# Patient Record
Sex: MALE | Race: BLACK OR AFRICAN AMERICAN | Employment: OTHER | URBAN - METROPOLITAN AREA
[De-identification: names, ages, dates, MRNs, and addresses within clinical notes are randomized per-mention and may not be internally consistent; named-entity substitution may affect disease eponyms.]

---

## 2017-01-03 RX ORDER — CLOPIDOGREL BISULFATE 75 MG/1
TABLET ORAL
Qty: 90 TABLET | Refills: 1 | Status: SHIPPED | OUTPATIENT
Start: 2017-01-03 | End: 2017-07-07 | Stop reason: SDUPTHER

## 2017-01-09 RX ORDER — CLOPIDOGREL BISULFATE 75 MG/1
TABLET ORAL
Qty: 90 TABLET | Refills: 3 | Status: SHIPPED | OUTPATIENT
Start: 2017-01-09 | End: 2017-07-07 | Stop reason: SDUPTHER

## 2017-02-17 RX ORDER — LOVASTATIN 20 MG/1
TABLET ORAL
Qty: 90 TABLET | Refills: 3 | Status: SHIPPED | OUTPATIENT
Start: 2017-02-17 | End: 2018-02-16 | Stop reason: SDUPTHER

## 2017-02-17 RX ORDER — POTASSIUM CHLORIDE 600 MG/1
TABLET, FILM COATED, EXTENDED RELEASE ORAL
Qty: 90 TABLET | Refills: 3 | Status: SHIPPED | OUTPATIENT
Start: 2017-02-17 | End: 2018-02-02 | Stop reason: SDUPTHER

## 2017-02-20 DIAGNOSIS — I50.9 CHF WITH CARDIOMYOPATHY (HCC): ICD-10-CM

## 2017-02-20 DIAGNOSIS — I10 ESSENTIAL HYPERTENSION: Primary | ICD-10-CM

## 2017-02-20 DIAGNOSIS — E78.00 PURE HYPERCHOLESTEROLEMIA: ICD-10-CM

## 2017-02-20 DIAGNOSIS — I25.10 CORONARY ARTERY DISEASE INVOLVING NATIVE CORONARY ARTERY OF NATIVE HEART WITHOUT ANGINA PECTORIS: ICD-10-CM

## 2017-02-20 DIAGNOSIS — I42.9 CHF WITH CARDIOMYOPATHY (HCC): ICD-10-CM

## 2017-02-20 RX ORDER — AMLODIPINE BESYLATE 5 MG/1
5 TABLET ORAL DAILY
Qty: 90 TABLET | Refills: 3 | Status: SHIPPED | OUTPATIENT
Start: 2017-02-20 | End: 2018-02-16 | Stop reason: SDUPTHER

## 2017-02-28 ENCOUNTER — OFFICE VISIT (OUTPATIENT)
Dept: CARDIOLOGY | Age: 77
End: 2017-02-28

## 2017-02-28 ENCOUNTER — HOSPITAL ENCOUNTER (OUTPATIENT)
Dept: GENERAL RADIOLOGY | Age: 77
Discharge: HOME | End: 2017-02-28
Payer: MEDICARE

## 2017-02-28 VITALS
OXYGEN SATURATION: 97 % | DIASTOLIC BLOOD PRESSURE: 67 MMHG | SYSTOLIC BLOOD PRESSURE: 112 MMHG | TEMPERATURE: 97.8 F | HEART RATE: 62 BPM | HEIGHT: 64 IN | BODY MASS INDEX: 25.27 KG/M2 | RESPIRATION RATE: 18 BRPM | WEIGHT: 148 LBS

## 2017-02-28 DIAGNOSIS — E78.00 PURE HYPERCHOLESTEROLEMIA: ICD-10-CM

## 2017-02-28 DIAGNOSIS — Z87.891 HISTORY OF SMOKING: ICD-10-CM

## 2017-02-28 DIAGNOSIS — I42.9 CHF WITH CARDIOMYOPATHY (HCC): ICD-10-CM

## 2017-02-28 DIAGNOSIS — I25.10 CORONARY ARTERY DISEASE INVOLVING NATIVE CORONARY ARTERY OF NATIVE HEART WITHOUT ANGINA PECTORIS: ICD-10-CM

## 2017-02-28 DIAGNOSIS — I50.9 CHF WITH CARDIOMYOPATHY (HCC): ICD-10-CM

## 2017-02-28 DIAGNOSIS — J18.9 PNEUMONITIS: ICD-10-CM

## 2017-02-28 DIAGNOSIS — R06.02 SHORTNESS OF BREATH: ICD-10-CM

## 2017-02-28 DIAGNOSIS — J18.9 PNEUMONITIS: Primary | ICD-10-CM

## 2017-02-28 DIAGNOSIS — I10 ESSENTIAL HYPERTENSION: ICD-10-CM

## 2017-02-28 PROCEDURE — G8427 DOCREV CUR MEDS BY ELIG CLIN: HCPCS | Performed by: INTERNAL MEDICINE

## 2017-02-28 PROCEDURE — 4040F PNEUMOC VAC/ADMIN/RCVD: CPT | Performed by: INTERNAL MEDICINE

## 2017-02-28 PROCEDURE — 1123F ACP DISCUSS/DSCN MKR DOCD: CPT | Performed by: INTERNAL MEDICINE

## 2017-02-28 PROCEDURE — G8420 CALC BMI NORM PARAMETERS: HCPCS | Performed by: INTERNAL MEDICINE

## 2017-02-28 PROCEDURE — 93000 ELECTROCARDIOGRAM COMPLETE: CPT | Performed by: INTERNAL MEDICINE

## 2017-02-28 PROCEDURE — 71020 XR CHEST STANDARD TWO VW: CPT

## 2017-02-28 PROCEDURE — 1036F TOBACCO NON-USER: CPT | Performed by: INTERNAL MEDICINE

## 2017-02-28 PROCEDURE — 99214 OFFICE O/P EST MOD 30 MIN: CPT | Performed by: INTERNAL MEDICINE

## 2017-02-28 PROCEDURE — G8598 ASA/ANTIPLAT THER USED: HCPCS | Performed by: INTERNAL MEDICINE

## 2017-02-28 PROCEDURE — G8484 FLU IMMUNIZE NO ADMIN: HCPCS | Performed by: INTERNAL MEDICINE

## 2017-03-14 ASSESSMENT — ENCOUNTER SYMPTOMS
CHEST TIGHTNESS: 0
GASTROINTESTINAL NEGATIVE: 1
EYES NEGATIVE: 1
SHORTNESS OF BREATH: 0
ALLERGIC/IMMUNOLOGIC NEGATIVE: 1

## 2017-04-03 RX ORDER — SPIRONOLACTONE AND HYDROCHLOROTHIAZIDE 25; 25 MG/1; MG/1
TABLET ORAL
Qty: 45 TABLET | Refills: 2 | Status: SHIPPED | OUTPATIENT
Start: 2017-04-03 | End: 2018-04-05 | Stop reason: SDUPTHER

## 2017-06-12 RX ORDER — OMEPRAZOLE 20 MG/1
CAPSULE, DELAYED RELEASE ORAL
Qty: 90 CAPSULE | Refills: 2 | Status: SHIPPED | OUTPATIENT
Start: 2017-06-12 | End: 2018-02-16 | Stop reason: SDUPTHER

## 2017-07-07 DIAGNOSIS — I10 ESSENTIAL HYPERTENSION: ICD-10-CM

## 2017-07-07 RX ORDER — CLOPIDOGREL BISULFATE 75 MG/1
TABLET ORAL
Qty: 90 TABLET | Refills: 3 | Status: SHIPPED | OUTPATIENT
Start: 2017-07-07 | End: 2018-04-04 | Stop reason: SDUPTHER

## 2017-07-07 RX ORDER — CARVEDILOL 25 MG/1
TABLET ORAL
Qty: 180 TABLET | Refills: 3 | Status: SHIPPED | OUTPATIENT
Start: 2017-07-07 | End: 2018-06-29 | Stop reason: SDUPTHER

## 2017-12-06 RX ORDER — ENALAPRIL MALEATE 20 MG/1
20 TABLET ORAL DAILY
Qty: 90 TABLET | Refills: 2 | Status: SHIPPED | OUTPATIENT
Start: 2017-12-06 | End: 2020-01-29 | Stop reason: ALTCHOICE

## 2018-02-08 RX ORDER — POTASSIUM CHLORIDE 600 MG/1
TABLET, FILM COATED, EXTENDED RELEASE ORAL
Qty: 90 TABLET | Refills: 3 | Status: SHIPPED | OUTPATIENT
Start: 2018-02-08 | End: 2019-02-14 | Stop reason: SDUPTHER

## 2018-02-16 DIAGNOSIS — I50.9 CHF WITH CARDIOMYOPATHY (HCC): ICD-10-CM

## 2018-02-16 DIAGNOSIS — E78.00 PURE HYPERCHOLESTEROLEMIA: ICD-10-CM

## 2018-02-16 DIAGNOSIS — I42.9 CHF WITH CARDIOMYOPATHY (HCC): ICD-10-CM

## 2018-02-16 DIAGNOSIS — I10 ESSENTIAL HYPERTENSION: ICD-10-CM

## 2018-02-16 DIAGNOSIS — I25.10 CORONARY ARTERY DISEASE INVOLVING NATIVE CORONARY ARTERY OF NATIVE HEART WITHOUT ANGINA PECTORIS: ICD-10-CM

## 2018-02-16 RX ORDER — AMLODIPINE BESYLATE 5 MG/1
5 TABLET ORAL DAILY
Qty: 90 TABLET | Refills: 3 | Status: SHIPPED | OUTPATIENT
Start: 2018-02-16 | End: 2020-01-29 | Stop reason: ALTCHOICE

## 2018-02-16 RX ORDER — LOVASTATIN 20 MG/1
TABLET ORAL
Qty: 90 TABLET | Refills: 2 | Status: SHIPPED | OUTPATIENT
Start: 2018-02-16 | End: 2018-11-13 | Stop reason: SDUPTHER

## 2018-02-16 RX ORDER — OMEPRAZOLE 20 MG/1
CAPSULE, DELAYED RELEASE ORAL
Qty: 90 CAPSULE | Refills: 2 | Status: SHIPPED | OUTPATIENT
Start: 2018-02-16 | End: 2018-11-09 | Stop reason: SDUPTHER

## 2018-04-02 ENCOUNTER — OFFICE VISIT (OUTPATIENT)
Dept: UROLOGY | Age: 78
End: 2018-04-02
Payer: MEDICARE

## 2018-04-02 VITALS
BODY MASS INDEX: 23.99 KG/M2 | HEIGHT: 65 IN | HEART RATE: 82 BPM | WEIGHT: 144 LBS | DIASTOLIC BLOOD PRESSURE: 72 MMHG | SYSTOLIC BLOOD PRESSURE: 130 MMHG

## 2018-04-02 DIAGNOSIS — R33.9 URINARY RETENTION: Primary | ICD-10-CM

## 2018-04-02 LAB — POST VOID RESIDUAL (PVR): 81 ML

## 2018-04-02 PROCEDURE — 1123F ACP DISCUSS/DSCN MKR DOCD: CPT | Performed by: UROLOGY

## 2018-04-02 PROCEDURE — G8598 ASA/ANTIPLAT THER USED: HCPCS | Performed by: UROLOGY

## 2018-04-02 PROCEDURE — 51798 US URINE CAPACITY MEASURE: CPT | Performed by: UROLOGY

## 2018-04-02 PROCEDURE — 99202 OFFICE O/P NEW SF 15 MIN: CPT | Performed by: UROLOGY

## 2018-04-02 PROCEDURE — G8427 DOCREV CUR MEDS BY ELIG CLIN: HCPCS | Performed by: UROLOGY

## 2018-04-02 PROCEDURE — 4040F PNEUMOC VAC/ADMIN/RCVD: CPT | Performed by: UROLOGY

## 2018-04-02 PROCEDURE — G8420 CALC BMI NORM PARAMETERS: HCPCS | Performed by: UROLOGY

## 2018-04-02 PROCEDURE — 1036F TOBACCO NON-USER: CPT | Performed by: UROLOGY

## 2018-04-02 ASSESSMENT — PATIENT HEALTH QUESTIONNAIRE - PHQ9
1. LITTLE INTEREST OR PLEASURE IN DOING THINGS: 0
SUM OF ALL RESPONSES TO PHQ9 QUESTIONS 1 & 2: 1
SUM OF ALL RESPONSES TO PHQ QUESTIONS 1-9: 1
2. FEELING DOWN, DEPRESSED OR HOPELESS: 1

## 2018-04-04 RX ORDER — CLOPIDOGREL BISULFATE 75 MG/1
TABLET ORAL
Qty: 90 TABLET | Refills: 3 | Status: SHIPPED | OUTPATIENT
Start: 2018-04-04 | End: 2019-03-21 | Stop reason: SDUPTHER

## 2018-04-05 RX ORDER — SPIRONOLACTONE AND HYDROCHLOROTHIAZIDE 25; 25 MG/1; MG/1
TABLET ORAL
Qty: 45 TABLET | Refills: 2 | Status: SHIPPED | OUTPATIENT
Start: 2018-04-05 | End: 2019-03-05 | Stop reason: SDUPTHER

## 2018-04-30 ENCOUNTER — OFFICE VISIT (OUTPATIENT)
Dept: CARDIOLOGY CLINIC | Age: 78
End: 2018-04-30
Payer: MEDICARE

## 2018-04-30 VITALS
RESPIRATION RATE: 16 BRPM | OXYGEN SATURATION: 96 % | HEIGHT: 64 IN | SYSTOLIC BLOOD PRESSURE: 136 MMHG | WEIGHT: 148 LBS | DIASTOLIC BLOOD PRESSURE: 74 MMHG | HEART RATE: 78 BPM | BODY MASS INDEX: 25.27 KG/M2

## 2018-04-30 DIAGNOSIS — R09.89 BILATERAL CAROTID BRUITS: Primary | ICD-10-CM

## 2018-04-30 DIAGNOSIS — I50.9 CHF WITH CARDIOMYOPATHY (HCC): ICD-10-CM

## 2018-04-30 DIAGNOSIS — I42.9 CHF WITH CARDIOMYOPATHY (HCC): ICD-10-CM

## 2018-04-30 DIAGNOSIS — E78.00 PURE HYPERCHOLESTEROLEMIA: ICD-10-CM

## 2018-04-30 DIAGNOSIS — I10 ESSENTIAL HYPERTENSION: ICD-10-CM

## 2018-04-30 DIAGNOSIS — Z98.61 S/P PTCA (PERCUTANEOUS TRANSLUMINAL CORONARY ANGIOPLASTY): ICD-10-CM

## 2018-04-30 DIAGNOSIS — R06.09 DOE (DYSPNEA ON EXERTION): ICD-10-CM

## 2018-04-30 PROCEDURE — 93000 ELECTROCARDIOGRAM COMPLETE: CPT | Performed by: INTERNAL MEDICINE

## 2018-04-30 PROCEDURE — 1036F TOBACCO NON-USER: CPT | Performed by: INTERNAL MEDICINE

## 2018-04-30 PROCEDURE — G8598 ASA/ANTIPLAT THER USED: HCPCS | Performed by: INTERNAL MEDICINE

## 2018-04-30 PROCEDURE — G8419 CALC BMI OUT NRM PARAM NOF/U: HCPCS | Performed by: INTERNAL MEDICINE

## 2018-04-30 PROCEDURE — 99214 OFFICE O/P EST MOD 30 MIN: CPT | Performed by: INTERNAL MEDICINE

## 2018-04-30 PROCEDURE — 1123F ACP DISCUSS/DSCN MKR DOCD: CPT | Performed by: INTERNAL MEDICINE

## 2018-04-30 PROCEDURE — G8427 DOCREV CUR MEDS BY ELIG CLIN: HCPCS | Performed by: INTERNAL MEDICINE

## 2018-04-30 PROCEDURE — 4040F PNEUMOC VAC/ADMIN/RCVD: CPT | Performed by: INTERNAL MEDICINE

## 2018-04-30 ASSESSMENT — ENCOUNTER SYMPTOMS
BLOOD IN STOOL: 0
SHORTNESS OF BREATH: 1
NAUSEA: 0
GASTROINTESTINAL NEGATIVE: 1
CHEST TIGHTNESS: 0
EYES NEGATIVE: 1
COUGH: 0
WHEEZING: 0
STRIDOR: 0

## 2018-05-01 DIAGNOSIS — I42.9 CHF WITH CARDIOMYOPATHY (HCC): ICD-10-CM

## 2018-05-01 DIAGNOSIS — R06.09 DOE (DYSPNEA ON EXERTION): Primary | ICD-10-CM

## 2018-05-01 DIAGNOSIS — I50.9 CHF WITH CARDIOMYOPATHY (HCC): ICD-10-CM

## 2018-05-18 ENCOUNTER — HOSPITAL ENCOUNTER (OUTPATIENT)
Dept: NON INVASIVE DIAGNOSTICS | Age: 78
Discharge: HOME | End: 2018-05-18
Payer: MEDICARE

## 2018-05-18 ENCOUNTER — HOSPITAL ENCOUNTER (OUTPATIENT)
Dept: NUCLEAR MEDICINE | Age: 78
Discharge: HOME | End: 2018-05-20
Payer: MEDICARE

## 2018-05-18 ENCOUNTER — HOSPITAL ENCOUNTER (OUTPATIENT)
Dept: ULTRASOUND IMAGING | Age: 78
Discharge: HOME | End: 2018-05-20
Payer: MEDICARE

## 2018-05-18 DIAGNOSIS — R06.09 DOE (DYSPNEA ON EXERTION): ICD-10-CM

## 2018-05-18 DIAGNOSIS — I42.9 CHF WITH CARDIOMYOPATHY (HCC): ICD-10-CM

## 2018-05-18 DIAGNOSIS — I50.9 CHF WITH CARDIOMYOPATHY (HCC): ICD-10-CM

## 2018-05-18 DIAGNOSIS — R09.89 BILATERAL CAROTID BRUITS: ICD-10-CM

## 2018-05-18 LAB
LV EF: 35 %
LVEF MODALITY: NORMAL

## 2018-05-18 PROCEDURE — 6360000002 HC RX W HCPCS: Performed by: INTERNAL MEDICINE

## 2018-05-18 PROCEDURE — 93880 EXTRACRANIAL BILAT STUDY: CPT

## 2018-05-18 PROCEDURE — 93017 CV STRESS TEST TRACING ONLY: CPT

## 2018-05-18 PROCEDURE — A9502 TC99M TETROFOSMIN: HCPCS | Performed by: INTERNAL MEDICINE

## 2018-05-18 PROCEDURE — 78452 HT MUSCLE IMAGE SPECT MULT: CPT

## 2018-05-18 PROCEDURE — 93306 TTE W/DOPPLER COMPLETE: CPT

## 2018-05-18 PROCEDURE — 2580000003 HC RX 258: Performed by: INTERNAL MEDICINE

## 2018-05-18 PROCEDURE — 3430000000 HC RX DIAGNOSTIC RADIOPHARMACEUTICAL: Performed by: INTERNAL MEDICINE

## 2018-05-18 RX ORDER — SODIUM CHLORIDE 0.9 % (FLUSH) 0.9 %
10 SYRINGE (ML) INJECTION PRN
Status: DISCONTINUED | OUTPATIENT
Start: 2018-05-18 | End: 2018-05-21 | Stop reason: HOSPADM

## 2018-05-18 RX ADMIN — Medication 10 ML: at 11:49

## 2018-05-18 RX ADMIN — TETROFOSMIN 10.3 MILLICURIE: 0.23 INJECTION, POWDER, LYOPHILIZED, FOR SOLUTION INTRAVENOUS at 08:50

## 2018-05-18 RX ADMIN — Medication 10 ML: at 08:50

## 2018-05-18 RX ADMIN — REGADENOSON 0.4 MG: 0.08 INJECTION, SOLUTION INTRAVENOUS at 11:48

## 2018-05-18 RX ADMIN — TETROFOSMIN 35.7 MILLICURIE: 0.23 INJECTION, POWDER, LYOPHILIZED, FOR SOLUTION INTRAVENOUS at 11:48

## 2018-05-18 RX ADMIN — Medication 10 ML: at 11:48

## 2018-06-01 ENCOUNTER — OFFICE VISIT (OUTPATIENT)
Dept: CARDIOLOGY CLINIC | Age: 78
End: 2018-06-01
Payer: MEDICARE

## 2018-06-01 VITALS
SYSTOLIC BLOOD PRESSURE: 134 MMHG | BODY MASS INDEX: 25.1 KG/M2 | WEIGHT: 147 LBS | HEART RATE: 56 BPM | DIASTOLIC BLOOD PRESSURE: 72 MMHG | OXYGEN SATURATION: 97 % | RESPIRATION RATE: 16 BRPM | HEIGHT: 64 IN

## 2018-06-01 DIAGNOSIS — I42.9 CHF WITH CARDIOMYOPATHY (HCC): ICD-10-CM

## 2018-06-01 DIAGNOSIS — R06.09 DOE (DYSPNEA ON EXERTION): ICD-10-CM

## 2018-06-01 DIAGNOSIS — Z98.61 S/P PTCA (PERCUTANEOUS TRANSLUMINAL CORONARY ANGIOPLASTY): ICD-10-CM

## 2018-06-01 DIAGNOSIS — I25.10 CORONARY ARTERY DISEASE INVOLVING NATIVE CORONARY ARTERY OF NATIVE HEART WITHOUT ANGINA PECTORIS: ICD-10-CM

## 2018-06-01 DIAGNOSIS — I10 ESSENTIAL HYPERTENSION: Primary | ICD-10-CM

## 2018-06-01 DIAGNOSIS — E78.00 PURE HYPERCHOLESTEROLEMIA: ICD-10-CM

## 2018-06-01 DIAGNOSIS — I50.9 CHF WITH CARDIOMYOPATHY (HCC): ICD-10-CM

## 2018-06-01 PROCEDURE — G8598 ASA/ANTIPLAT THER USED: HCPCS | Performed by: INTERNAL MEDICINE

## 2018-06-01 PROCEDURE — G8427 DOCREV CUR MEDS BY ELIG CLIN: HCPCS | Performed by: INTERNAL MEDICINE

## 2018-06-01 PROCEDURE — 1036F TOBACCO NON-USER: CPT | Performed by: INTERNAL MEDICINE

## 2018-06-01 PROCEDURE — G8419 CALC BMI OUT NRM PARAM NOF/U: HCPCS | Performed by: INTERNAL MEDICINE

## 2018-06-01 PROCEDURE — 1123F ACP DISCUSS/DSCN MKR DOCD: CPT | Performed by: INTERNAL MEDICINE

## 2018-06-01 PROCEDURE — 99214 OFFICE O/P EST MOD 30 MIN: CPT | Performed by: INTERNAL MEDICINE

## 2018-06-01 PROCEDURE — 4040F PNEUMOC VAC/ADMIN/RCVD: CPT | Performed by: INTERNAL MEDICINE

## 2018-06-01 ASSESSMENT — ENCOUNTER SYMPTOMS
STRIDOR: 0
COUGH: 0
CHEST TIGHTNESS: 0
SHORTNESS OF BREATH: 1
EYES NEGATIVE: 1
GASTROINTESTINAL NEGATIVE: 1
BLOOD IN STOOL: 0
WHEEZING: 0
NAUSEA: 0

## 2018-06-15 ENCOUNTER — HOSPITAL ENCOUNTER (OUTPATIENT)
Dept: NUCLEAR MEDICINE | Age: 78
Discharge: HOME | End: 2018-06-17
Payer: MEDICARE

## 2018-06-15 DIAGNOSIS — I42.9 CHF WITH CARDIOMYOPATHY (HCC): ICD-10-CM

## 2018-06-15 DIAGNOSIS — I50.9 CHF WITH CARDIOMYOPATHY (HCC): ICD-10-CM

## 2018-06-15 PROCEDURE — A9560 TC99M LABELED RBC: HCPCS | Performed by: INTERNAL MEDICINE

## 2018-06-15 PROCEDURE — 78472 GATED HEART PLANAR SINGLE: CPT

## 2018-06-15 PROCEDURE — 6360000002 HC RX W HCPCS: Performed by: INTERNAL MEDICINE

## 2018-06-15 PROCEDURE — 3430000000 HC RX DIAGNOSTIC RADIOPHARMACEUTICAL: Performed by: INTERNAL MEDICINE

## 2018-06-15 RX ORDER — HEPARIN SODIUM 1000 [USP'U]/ML
1000 INJECTION, SOLUTION INTRAVENOUS; SUBCUTANEOUS ONCE
Status: COMPLETED | OUTPATIENT
Start: 2018-06-15 | End: 2018-06-15

## 2018-06-15 RX ADMIN — HEPARIN SODIUM 1000 UNITS: 1000 INJECTION, SOLUTION INTRAVENOUS; SUBCUTANEOUS at 11:40

## 2018-06-15 RX ADMIN — Medication 27.7 MILLICURIE: at 12:20

## 2018-06-16 LAB
LV EF: 45 %
LVEF MODALITY: NORMAL

## 2018-06-29 ENCOUNTER — OFFICE VISIT (OUTPATIENT)
Dept: CARDIOLOGY CLINIC | Age: 78
End: 2018-06-29
Payer: MEDICARE

## 2018-06-29 VITALS
RESPIRATION RATE: 22 BRPM | WEIGHT: 145.2 LBS | HEIGHT: 64 IN | BODY MASS INDEX: 24.79 KG/M2 | HEART RATE: 77 BPM | SYSTOLIC BLOOD PRESSURE: 132 MMHG | DIASTOLIC BLOOD PRESSURE: 78 MMHG | TEMPERATURE: 97.4 F | OXYGEN SATURATION: 97 %

## 2018-06-29 DIAGNOSIS — I25.5 ISCHEMIC CARDIOMYOPATHY: ICD-10-CM

## 2018-06-29 DIAGNOSIS — I10 ESSENTIAL HYPERTENSION: ICD-10-CM

## 2018-06-29 DIAGNOSIS — I10 ESSENTIAL HYPERTENSION: Primary | ICD-10-CM

## 2018-06-29 DIAGNOSIS — Z98.61 S/P PTCA (PERCUTANEOUS TRANSLUMINAL CORONARY ANGIOPLASTY): ICD-10-CM

## 2018-06-29 DIAGNOSIS — R06.09 DOE (DYSPNEA ON EXERTION): ICD-10-CM

## 2018-06-29 PROCEDURE — 4040F PNEUMOC VAC/ADMIN/RCVD: CPT | Performed by: INTERNAL MEDICINE

## 2018-06-29 PROCEDURE — 99214 OFFICE O/P EST MOD 30 MIN: CPT | Performed by: INTERNAL MEDICINE

## 2018-06-29 PROCEDURE — G8598 ASA/ANTIPLAT THER USED: HCPCS | Performed by: INTERNAL MEDICINE

## 2018-06-29 PROCEDURE — G8420 CALC BMI NORM PARAMETERS: HCPCS | Performed by: INTERNAL MEDICINE

## 2018-06-29 PROCEDURE — 1123F ACP DISCUSS/DSCN MKR DOCD: CPT | Performed by: INTERNAL MEDICINE

## 2018-06-29 PROCEDURE — 1036F TOBACCO NON-USER: CPT | Performed by: INTERNAL MEDICINE

## 2018-06-29 PROCEDURE — G8427 DOCREV CUR MEDS BY ELIG CLIN: HCPCS | Performed by: INTERNAL MEDICINE

## 2018-06-29 RX ORDER — CARVEDILOL 25 MG/1
TABLET ORAL
Qty: 180 TABLET | Refills: 3 | Status: SHIPPED | OUTPATIENT
Start: 2018-06-29 | End: 2019-06-19 | Stop reason: SDUPTHER

## 2018-06-29 ASSESSMENT — ENCOUNTER SYMPTOMS
SHORTNESS OF BREATH: 1
STRIDOR: 0
WHEEZING: 0
NAUSEA: 0
CHEST TIGHTNESS: 0
EYES NEGATIVE: 1
BLOOD IN STOOL: 0
GASTROINTESTINAL NEGATIVE: 1
COUGH: 0

## 2018-09-28 ENCOUNTER — OFFICE VISIT (OUTPATIENT)
Dept: CARDIOLOGY CLINIC | Age: 78
End: 2018-09-28
Payer: MEDICARE

## 2018-09-28 VITALS
HEIGHT: 64 IN | BODY MASS INDEX: 24.75 KG/M2 | HEART RATE: 69 BPM | RESPIRATION RATE: 16 BRPM | DIASTOLIC BLOOD PRESSURE: 76 MMHG | OXYGEN SATURATION: 97 % | SYSTOLIC BLOOD PRESSURE: 122 MMHG | WEIGHT: 145 LBS

## 2018-09-28 DIAGNOSIS — R09.89 BILATERAL CAROTID BRUITS: ICD-10-CM

## 2018-09-28 DIAGNOSIS — E78.00 PURE HYPERCHOLESTEROLEMIA: ICD-10-CM

## 2018-09-28 DIAGNOSIS — R06.09 DOE (DYSPNEA ON EXERTION): ICD-10-CM

## 2018-09-28 DIAGNOSIS — I25.5 ISCHEMIC CARDIOMYOPATHY: ICD-10-CM

## 2018-09-28 DIAGNOSIS — Z98.61 S/P PTCA (PERCUTANEOUS TRANSLUMINAL CORONARY ANGIOPLASTY): ICD-10-CM

## 2018-09-28 DIAGNOSIS — I42.9 CHF WITH CARDIOMYOPATHY (HCC): ICD-10-CM

## 2018-09-28 DIAGNOSIS — I25.10 CORONARY ARTERY DISEASE INVOLVING NATIVE CORONARY ARTERY OF NATIVE HEART WITHOUT ANGINA PECTORIS: ICD-10-CM

## 2018-09-28 DIAGNOSIS — I50.9 CHF WITH CARDIOMYOPATHY (HCC): ICD-10-CM

## 2018-09-28 DIAGNOSIS — I10 ESSENTIAL HYPERTENSION: Primary | ICD-10-CM

## 2018-09-28 PROCEDURE — 1101F PT FALLS ASSESS-DOCD LE1/YR: CPT | Performed by: INTERNAL MEDICINE

## 2018-09-28 PROCEDURE — G8598 ASA/ANTIPLAT THER USED: HCPCS | Performed by: INTERNAL MEDICINE

## 2018-09-28 PROCEDURE — 99214 OFFICE O/P EST MOD 30 MIN: CPT | Performed by: INTERNAL MEDICINE

## 2018-09-28 PROCEDURE — 1123F ACP DISCUSS/DSCN MKR DOCD: CPT | Performed by: INTERNAL MEDICINE

## 2018-09-28 PROCEDURE — 4040F PNEUMOC VAC/ADMIN/RCVD: CPT | Performed by: INTERNAL MEDICINE

## 2018-09-28 PROCEDURE — G8427 DOCREV CUR MEDS BY ELIG CLIN: HCPCS | Performed by: INTERNAL MEDICINE

## 2018-09-28 PROCEDURE — 1036F TOBACCO NON-USER: CPT | Performed by: INTERNAL MEDICINE

## 2018-09-28 PROCEDURE — G8420 CALC BMI NORM PARAMETERS: HCPCS | Performed by: INTERNAL MEDICINE

## 2018-09-28 ASSESSMENT — ENCOUNTER SYMPTOMS
NAUSEA: 0
CHEST TIGHTNESS: 0
RESPIRATORY NEGATIVE: 1
SHORTNESS OF BREATH: 0
BLOOD IN STOOL: 0
WHEEZING: 0
EYES NEGATIVE: 1
STRIDOR: 0
COUGH: 0
GASTROINTESTINAL NEGATIVE: 1

## 2018-09-28 NOTE — PROGRESS NOTES
Subsequent Progress Note  Patient: Nikita Justice  YOB: 1940  MRN: [id number]    Chief Complaint: sob cmp  Chief Complaint   Patient presents with    Hypertension    Shortness of Breath    Cardiomyopathy       CV Data:  2010 CAD stent  5/2018 spect negative  5/2018 echo EF 35%  5/2018 CUS <  6/2018 MUGA 45%  Subjective/HPI: no cp breathing better no fall s no bleed     EKG:    Past Medical History:   Diagnosis Date    Arthritis     Hip and back    Asthma     CAD (coronary artery disease)     CHF with cardiomyopathy ([address]     GERD (gastroesophageal reflux disease)     History of hemoptysis     Hyperlipidemia     Hypertension 03/19/2010    Interstitial pneumonitis (HCC)     Sinus trouble        Past Surgical History:   Procedure Laterality Date    BRONCHOSCOPY  3/30/2010    COLONOSCOPY      CORONARY ANGIOPLASTY WITH STENT PLACEMENT Left 4/2/2010    with percutaneous coronary intervention of the right coronary artery    TONSILLECTOMY      UPPER GASTROINTESTINAL ENDOSCOPY         Family History   Problem Relation Age of Onset    Family history unknown: Yes       Social History     Social History    Marital status:       Spouse name: N/A    Number of children: N/A    Years of education: N/A     Occupational History    Retired [employer]     Social History Main Topics    Smoking status: Former Smoker     Packs/day: 1.00     Years: 6.00     Types: Cigarettes     Quit date: 1/1/1963    Smokeless tobacco: Never Used    Alcohol use No    Drug use: No    Sexual activity: No     Other Topics Concern    None     Social History Narrative    None       Allergies   Allergen Reactions    Pcn [Penicillins]        Current Outpatient Prescriptions   Medication Sig Dispense Refill    carvedilol (COREG) 25 MG tablet TAKE 1 TABLET BY MOUTH 2 TIMES DAILY 180 tablet 3    spironolactone-hydrochlorothiazide (ALDACTAZIDE) 25-25 MG per tablet TAKE 1/2 TABLET DAILY 45 tablet 2    clopidogrel (PLAVIX) 75 MG tablet TAKE 1 TABLET BY MOUTH EVERY DAY 90 tablet 3    amLODIPine (NORVASC) 5 MG tablet Take 1 tablet by mouth daily 90 tablet 3    lovastatin (MEVACOR) 20 MG tablet TAKE 1 TABLET BY MOUTH EVERY DAY 90 tablet 2    omeprazole (PRILOSEC) 20 MG delayed release capsule TAKE ONE CAPSULE BY MOUTH EVERY DAY 90 capsule 2    potassium chloride (KLOR-CON) 8 MEQ extended release tablet TAKE 1 TABLET BY MOUTH EVERY DAY 90 tablet 3    magnesium oxide (MAG-OX) 400 (241.3 Mg) MG TABS tablet TAKE 1 TABLET BY MOUTH EVERY DAY 90 tablet 2    enalapril (VASOTEC) 20 MG tablet Take 1 tablet by mouth daily 1/2 tablet po daily (Patient taking differently: Take 20 mg by mouth daily ) 90 tablet 2    Multiple Vitamins-Minerals (THERAPEUTIC MULTIVITAMIN-MINERALS) tablet Take 1 tablet by mouth daily      sucralfate (CARAFATE) 1 GM tablet   Take 1 g by mouth 4 times daily        No current facility-administered medications for this visit. Review of Systems:   Review of Systems   Constitutional: Negative for diaphoresis and fatigue. HENT: Negative. Eyes: Negative. Respiratory: Negative. Negative for cough, chest tightness, shortness of breath, wheezing and stridor. Cardiovascular: Negative. Negative for chest pain, palpitations and leg swelling. Gastrointestinal: Negative. Negative for blood in stool and nausea. Genitourinary: Negative. Musculoskeletal: Negative. Skin: Negative. Neurological: Positive for weakness. Negative for dizziness, syncope and light-headedness. Hematological: Negative. Psychiatric/Behavioral: Negative. Physical Examination:    /76 (Site: Left Upper Arm, Position: Sitting, Cuff Size: Large Adult)   Pulse 69   Resp 16   Ht 5' 4\" (1.626 m)   Wt 145 lb (65.8 kg)   SpO2 97%   BMI 24.89 kg/m²    Physical Exam   Constitutional: He appears healthy. No distress.    HENT:   Normal cephalic and Atraumatic   Eyes: Pupils are equal, round, and reactive to light.   Neck: Normal range of motion and thyroid normal. Neck supple. No JVD present. No neck adenopathy. No thyromegaly present. Cardiovascular: Normal rate, regular rhythm, intact distal pulses and normal pulses. Murmur heard. Pulmonary/Chest: Effort normal and breath sounds normal. He has no wheezes. He has no rales. He exhibits no tenderness. Abdominal: Soft. Bowel sounds are normal. There is no tenderness. Musculoskeletal: Normal range of motion. He exhibits no edema or tenderness. Neurological: He is alert and oriented to person, place, and time. Skin: Skin is warm. No cyanosis. Nails show no clubbing.        LABS:  CBC:   Lab Results   Component Value Date    WBC 7.7 05/16/2013    RBC 3.98 05/16/2013    HGB 13.6 05/16/2013    HCT 41.0 05/16/2013    .2 05/16/2013    MCH 34.1 05/16/2013    MCHC 33.1 05/16/2013    RDW 14.1 05/16/2013     05/16/2013    MPV 7.7 05/16/2013     Lipids:  Lab Results   Component Value Date    CHOL 193 05/16/2013     Lab Results   Component Value Date    TRIG 189 (H) 05/16/2013     Lab Results   Component Value Date    HDL 61 (H) 05/16/2013     Lab Results   Component Value Date    LDLCALC 94 05/16/2013     No results found for: LABVLDL, VLDL  No results found for: CHOLHDLRATIO  CMP:    Lab Results   Component Value Date     05/16/2013    K 3.3 05/16/2013     05/16/2013    CO2 26 05/16/2013    BUN 12 05/16/2013    CREATININE 0.77 05/16/2013    GFRAA >60.0 05/16/2013    LABGLOM >60.0 05/16/2013    GLUCOSE 85 05/16/2013    PROT 7.4 05/16/2013    LABALBU 4.3 05/16/2013    CALCIUM 9.0 05/16/2013    BILITOT 0.9 05/16/2013    ALKPHOS 73 05/16/2013    AST 29 05/16/2013    ALT 18 05/16/2013     BMP:    Lab Results   Component Value Date     05/16/2013    K 3.3 05/16/2013     05/16/2013    CO2 26 05/16/2013    BUN 12 05/16/2013    LABALBU 4.3 05/16/2013    CREATININE 0.77 05/16/2013    CALCIUM 9.0 05/16/2013    GFRAA >60.0 05/16/2013

## 2018-10-05 DIAGNOSIS — I10 ESSENTIAL HYPERTENSION: ICD-10-CM

## 2018-10-05 DIAGNOSIS — I25.10 CORONARY ARTERY DISEASE INVOLVING NATIVE CORONARY ARTERY OF NATIVE HEART WITHOUT ANGINA PECTORIS: ICD-10-CM

## 2018-10-05 LAB
ALBUMIN SERPL-MCNC: 4.3 G/DL (ref 3.9–4.9)
ALP BLD-CCNC: 94 U/L (ref 35–104)
ALT SERPL-CCNC: 40 U/L (ref 0–41)
ANION GAP SERPL CALCULATED.3IONS-SCNC: 17 MEQ/L (ref 7–13)
AST SERPL-CCNC: 46 U/L (ref 0–40)
BILIRUB SERPL-MCNC: 1 MG/DL (ref 0–1.2)
BUN BLDV-MCNC: 21 MG/DL (ref 8–23)
CALCIUM SERPL-MCNC: 9.5 MG/DL (ref 8.6–10.2)
CHLORIDE BLD-SCNC: 95 MEQ/L (ref 98–107)
CHOLESTEROL, TOTAL: 175 MG/DL (ref 0–199)
CO2: 22 MEQ/L (ref 22–29)
CREAT SERPL-MCNC: 1.25 MG/DL (ref 0.7–1.2)
GFR AFRICAN AMERICAN: >60
GFR NON-AFRICAN AMERICAN: 55.6
GLOBULIN: 3.7 G/DL (ref 2.3–3.5)
GLUCOSE BLD-MCNC: 92 MG/DL (ref 74–109)
HCT VFR BLD CALC: 44.9 % (ref 42–52)
HDLC SERPL-MCNC: 46 MG/DL (ref 40–59)
HEMOGLOBIN: 15.3 G/DL (ref 14–18)
LDL CHOLESTEROL CALCULATED: 101 MG/DL (ref 0–129)
MAGNESIUM: 2.2 MG/DL (ref 1.7–2.3)
MCH RBC QN AUTO: 35.8 PG (ref 27–31.3)
MCHC RBC AUTO-ENTMCNC: 34 % (ref 33–37)
MCV RBC AUTO: 105.2 FL (ref 80–100)
PDW BLD-RTO: 13.7 % (ref 11.5–14.5)
PLATELET # BLD: 399 K/UL (ref 130–400)
POTASSIUM SERPL-SCNC: 4.8 MEQ/L (ref 3.5–5.1)
RBC # BLD: 4.27 M/UL (ref 4.7–6.1)
SODIUM BLD-SCNC: 134 MEQ/L (ref 132–144)
TOTAL PROTEIN: 8 G/DL (ref 6.4–8.1)
TRIGL SERPL-MCNC: 138 MG/DL (ref 0–200)
TSH SERPL DL<=0.05 MIU/L-ACNC: 1.62 UIU/ML (ref 0.27–4.2)
WBC # BLD: 6.2 K/UL (ref 4.8–10.8)

## 2018-10-26 RX ORDER — LANOLIN ALCOHOL/MO/W.PET/CERES
CREAM (GRAM) TOPICAL
Qty: 90 TABLET | Refills: 2 | Status: SHIPPED | OUTPATIENT
Start: 2018-10-26 | End: 2020-01-29 | Stop reason: ALTCHOICE

## 2018-11-13 RX ORDER — OMEPRAZOLE 20 MG/1
CAPSULE, DELAYED RELEASE ORAL
Qty: 90 CAPSULE | Refills: 1 | Status: SHIPPED | OUTPATIENT
Start: 2018-11-13 | End: 2019-05-02 | Stop reason: SDUPTHER

## 2018-11-13 RX ORDER — LOVASTATIN 20 MG/1
TABLET ORAL
Qty: 90 TABLET | Refills: 2 | Status: SHIPPED | OUTPATIENT
Start: 2018-11-13 | End: 2019-08-10 | Stop reason: SDUPTHER

## 2019-01-04 ENCOUNTER — OFFICE VISIT (OUTPATIENT)
Dept: CARDIOLOGY CLINIC | Age: 79
End: 2019-01-04
Payer: MEDICARE

## 2019-01-04 VITALS
OXYGEN SATURATION: 98 % | RESPIRATION RATE: 18 BRPM | WEIGHT: 144 LBS | HEIGHT: 69 IN | HEART RATE: 67 BPM | DIASTOLIC BLOOD PRESSURE: 74 MMHG | SYSTOLIC BLOOD PRESSURE: 119 MMHG | BODY MASS INDEX: 21.33 KG/M2

## 2019-01-04 DIAGNOSIS — I50.9 CHF WITH CARDIOMYOPATHY (HCC): ICD-10-CM

## 2019-01-04 DIAGNOSIS — Z79.02 ENCOUNTER FOR CURRENT LONG TERM USE OF ANTIPLATELET DRUG: Primary | ICD-10-CM

## 2019-01-04 DIAGNOSIS — R06.09 DOE (DYSPNEA ON EXERTION): ICD-10-CM

## 2019-01-04 DIAGNOSIS — R31.0 GROSS HEMATURIA: ICD-10-CM

## 2019-01-04 DIAGNOSIS — I25.5 ISCHEMIC CARDIOMYOPATHY: ICD-10-CM

## 2019-01-04 DIAGNOSIS — I10 ESSENTIAL HYPERTENSION: Primary | ICD-10-CM

## 2019-01-04 DIAGNOSIS — I42.9 CHF WITH CARDIOMYOPATHY (HCC): ICD-10-CM

## 2019-01-04 DIAGNOSIS — I25.10 CORONARY ARTERY DISEASE INVOLVING NATIVE CORONARY ARTERY OF NATIVE HEART WITHOUT ANGINA PECTORIS: ICD-10-CM

## 2019-01-04 DIAGNOSIS — E78.00 PURE HYPERCHOLESTEROLEMIA: ICD-10-CM

## 2019-01-04 PROCEDURE — 99214 OFFICE O/P EST MOD 30 MIN: CPT | Performed by: INTERNAL MEDICINE

## 2019-01-04 PROCEDURE — G8598 ASA/ANTIPLAT THER USED: HCPCS | Performed by: INTERNAL MEDICINE

## 2019-01-04 PROCEDURE — 1036F TOBACCO NON-USER: CPT | Performed by: INTERNAL MEDICINE

## 2019-01-04 PROCEDURE — 4040F PNEUMOC VAC/ADMIN/RCVD: CPT | Performed by: INTERNAL MEDICINE

## 2019-01-04 PROCEDURE — G8420 CALC BMI NORM PARAMETERS: HCPCS | Performed by: INTERNAL MEDICINE

## 2019-01-04 PROCEDURE — G8484 FLU IMMUNIZE NO ADMIN: HCPCS | Performed by: INTERNAL MEDICINE

## 2019-01-04 PROCEDURE — 1123F ACP DISCUSS/DSCN MKR DOCD: CPT | Performed by: INTERNAL MEDICINE

## 2019-01-04 PROCEDURE — G8427 DOCREV CUR MEDS BY ELIG CLIN: HCPCS | Performed by: INTERNAL MEDICINE

## 2019-01-04 PROCEDURE — 1101F PT FALLS ASSESS-DOCD LE1/YR: CPT | Performed by: INTERNAL MEDICINE

## 2019-01-04 ASSESSMENT — ENCOUNTER SYMPTOMS
NAUSEA: 0
CHEST TIGHTNESS: 0
WHEEZING: 0
STRIDOR: 0
GASTROINTESTINAL NEGATIVE: 1
EYES NEGATIVE: 1
COUGH: 0
SHORTNESS OF BREATH: 1
BLOOD IN STOOL: 0

## 2019-01-17 ENCOUNTER — OFFICE VISIT (OUTPATIENT)
Dept: UROLOGY | Age: 79
End: 2019-01-17
Payer: MEDICARE

## 2019-01-17 VITALS
BODY MASS INDEX: 23.32 KG/M2 | WEIGHT: 140 LBS | HEART RATE: 72 BPM | HEIGHT: 65 IN | SYSTOLIC BLOOD PRESSURE: 134 MMHG | DIASTOLIC BLOOD PRESSURE: 88 MMHG

## 2019-01-17 DIAGNOSIS — R31.0 GROSS HEMATURIA: Primary | ICD-10-CM

## 2019-01-17 LAB
BILIRUBIN, POC: ABNORMAL
BLOOD URINE, POC: ABNORMAL
CLARITY, POC: CLEAR
COLOR, POC: YELLOW
GLUCOSE URINE, POC: ABNORMAL
KETONES, POC: ABNORMAL
LEUKOCYTE EST, POC: ABNORMAL
NITRITE, POC: POSITIVE
PH, POC: 5.5
PROTEIN, POC: ABNORMAL
SPECIFIC GRAVITY, POC: 1.02
UROBILINOGEN, POC: 1

## 2019-01-17 PROCEDURE — 1036F TOBACCO NON-USER: CPT | Performed by: UROLOGY

## 2019-01-17 PROCEDURE — G8598 ASA/ANTIPLAT THER USED: HCPCS | Performed by: UROLOGY

## 2019-01-17 PROCEDURE — 4040F PNEUMOC VAC/ADMIN/RCVD: CPT | Performed by: UROLOGY

## 2019-01-17 PROCEDURE — G8427 DOCREV CUR MEDS BY ELIG CLIN: HCPCS | Performed by: UROLOGY

## 2019-01-17 PROCEDURE — 1101F PT FALLS ASSESS-DOCD LE1/YR: CPT | Performed by: UROLOGY

## 2019-01-17 PROCEDURE — 81003 URINALYSIS AUTO W/O SCOPE: CPT | Performed by: UROLOGY

## 2019-01-17 PROCEDURE — G8420 CALC BMI NORM PARAMETERS: HCPCS | Performed by: UROLOGY

## 2019-01-17 PROCEDURE — 99214 OFFICE O/P EST MOD 30 MIN: CPT | Performed by: UROLOGY

## 2019-01-17 PROCEDURE — G8484 FLU IMMUNIZE NO ADMIN: HCPCS | Performed by: UROLOGY

## 2019-01-17 PROCEDURE — 1123F ACP DISCUSS/DSCN MKR DOCD: CPT | Performed by: UROLOGY

## 2019-01-20 LAB
ORGANISM: ABNORMAL
URINE CULTURE, ROUTINE: ABNORMAL
URINE CULTURE, ROUTINE: ABNORMAL

## 2019-01-21 RX ORDER — NITROFURANTOIN 25; 75 MG/1; MG/1
100 CAPSULE ORAL 2 TIMES DAILY
Qty: 14 CAPSULE | Refills: 0 | Status: SHIPPED | OUTPATIENT
Start: 2019-01-21 | End: 2019-01-28

## 2019-02-18 RX ORDER — POTASSIUM CHLORIDE 600 MG/1
TABLET, FILM COATED, EXTENDED RELEASE ORAL
Qty: 90 TABLET | Refills: 3 | Status: SHIPPED | OUTPATIENT
Start: 2019-02-18 | End: 2020-01-29 | Stop reason: ALTCHOICE

## 2019-03-07 RX ORDER — SPIRONOLACTONE AND HYDROCHLOROTHIAZIDE 25; 25 MG/1; MG/1
TABLET ORAL
Qty: 45 TABLET | Refills: 2 | Status: SHIPPED | OUTPATIENT
Start: 2019-03-07 | End: 2020-01-29 | Stop reason: ALTCHOICE

## 2019-03-22 RX ORDER — CLOPIDOGREL BISULFATE 75 MG/1
TABLET ORAL
Qty: 90 TABLET | Refills: 3 | Status: SHIPPED | OUTPATIENT
Start: 2019-03-22 | End: 2020-01-01

## 2019-04-05 ENCOUNTER — OFFICE VISIT (OUTPATIENT)
Dept: CARDIOLOGY CLINIC | Age: 79
End: 2019-04-05
Payer: MEDICARE

## 2019-04-05 VITALS
TEMPERATURE: 97.8 F | DIASTOLIC BLOOD PRESSURE: 87 MMHG | HEIGHT: 64 IN | WEIGHT: 145 LBS | OXYGEN SATURATION: 96 % | RESPIRATION RATE: 18 BRPM | HEART RATE: 75 BPM | SYSTOLIC BLOOD PRESSURE: 123 MMHG | BODY MASS INDEX: 24.75 KG/M2

## 2019-04-05 DIAGNOSIS — R06.09 DOE (DYSPNEA ON EXERTION): ICD-10-CM

## 2019-04-05 DIAGNOSIS — I10 ESSENTIAL HYPERTENSION: Primary | ICD-10-CM

## 2019-04-05 DIAGNOSIS — R09.89 BILATERAL CAROTID BRUITS: ICD-10-CM

## 2019-04-05 DIAGNOSIS — I25.5 ISCHEMIC CARDIOMYOPATHY: ICD-10-CM

## 2019-04-05 DIAGNOSIS — I25.10 CORONARY ARTERY DISEASE INVOLVING NATIVE CORONARY ARTERY OF NATIVE HEART WITHOUT ANGINA PECTORIS: ICD-10-CM

## 2019-04-05 DIAGNOSIS — E78.00 PURE HYPERCHOLESTEROLEMIA: ICD-10-CM

## 2019-04-05 DIAGNOSIS — I50.9 CHF WITH CARDIOMYOPATHY (HCC): ICD-10-CM

## 2019-04-05 DIAGNOSIS — I42.9 CHF WITH CARDIOMYOPATHY (HCC): ICD-10-CM

## 2019-04-05 DIAGNOSIS — R31.9 HEMATURIA, UNSPECIFIED TYPE: ICD-10-CM

## 2019-04-05 PROCEDURE — G8427 DOCREV CUR MEDS BY ELIG CLIN: HCPCS | Performed by: INTERNAL MEDICINE

## 2019-04-05 PROCEDURE — 4040F PNEUMOC VAC/ADMIN/RCVD: CPT | Performed by: INTERNAL MEDICINE

## 2019-04-05 PROCEDURE — 1123F ACP DISCUSS/DSCN MKR DOCD: CPT | Performed by: INTERNAL MEDICINE

## 2019-04-05 PROCEDURE — 1036F TOBACCO NON-USER: CPT | Performed by: INTERNAL MEDICINE

## 2019-04-05 PROCEDURE — 99214 OFFICE O/P EST MOD 30 MIN: CPT | Performed by: INTERNAL MEDICINE

## 2019-04-05 PROCEDURE — G8598 ASA/ANTIPLAT THER USED: HCPCS | Performed by: INTERNAL MEDICINE

## 2019-04-05 PROCEDURE — G8420 CALC BMI NORM PARAMETERS: HCPCS | Performed by: INTERNAL MEDICINE

## 2019-04-05 ASSESSMENT — ENCOUNTER SYMPTOMS
GASTROINTESTINAL NEGATIVE: 1
EYES NEGATIVE: 1
STRIDOR: 0
COUGH: 0
NAUSEA: 0
SHORTNESS OF BREATH: 0
RESPIRATORY NEGATIVE: 1
BLOOD IN STOOL: 0
CHEST TIGHTNESS: 0
WHEEZING: 0

## 2019-04-05 NOTE — PROGRESS NOTES
Subsequent Progress Note  Patient: Al Terrazas  YOB: 1940  MRN: [id number]    Chief Complaint: sob cmp   Chief Complaint   Patient presents with    Coronary Artery Disease    Hypertension    Congestive Heart Failure       CV Data:   CAD stent  2018 spect negative  2018 echo EF 35%  2018 CUS <  2018 MUGA 45%    Subjective/HPI:  Still has rare hematuria. No cp no sob recent URI still  Minimal cough  No falls takes med    nonsmoker     EKG:    Past Medical History:   Diagnosis Date    Arthritis     Hip and back    Asthma     CAD (coronary artery disease)     CHF with cardiomyopathy ([address]     GERD (gastroesophageal reflux disease)     History of hemoptysis     Hyperlipidemia     Hypertension 2010    Interstitial pneumonitis (HCC)     Sinus trouble        Past Surgical History:   Procedure Laterality Date    BRONCHOSCOPY  3/30/2010    COLONOSCOPY      CORONARY ANGIOPLASTY WITH STENT PLACEMENT Left 2010    with percutaneous coronary intervention of the right coronary artery    TONSILLECTOMY      UPPER GASTROINTESTINAL ENDOSCOPY         Family History   Family history unknown: Yes       Social History     Socioeconomic History    Marital status:       Spouse name: None    Number of children: None    Years of education: None    Highest education level: None   Occupational History    Occupation: Retired     Employer: [employer]   Social Needs    Financial resource strain: None    Food insecurity:     Worry: None     Inability: None    Transportation needs:     Medical: None     Non-medical: None   Tobacco Use    Smoking status: Former Smoker     Packs/day: 1.00     Years: 6.00     Pack years: 6.00     Types: Cigarettes     Last attempt to quit: 1963     Years since quittin.2    Smokeless tobacco: Never Used   Substance and Sexual Activity    Alcohol use: No     Alcohol/week: 0.0 oz    Drug use: No    Sexual activity: Never   Lifestyle    Physical activity:     Days per week: None     Minutes per session: None    Stress: None   Relationships    Social connections:     Talks on phone: None     Gets together: None     Attends [employer] service: None     Active member of club or organization: None     Attends meetings of clubs or organizations: None     Relationship status: None    Intimate partner violence:     Fear of current or ex partner: None     Emotionally abused: None     Physically abused: None     Forced sexual activity: None   Other Topics Concern    None   Social History Narrative    None       Allergies   Allergen Reactions    Pcn [Penicillins]        Current Outpatient Medications   Medication Sig Dispense Refill    clopidogrel (PLAVIX) 75 MG tablet TAKE 1 TABLET BY MOUTH EVERY DAY 90 tablet 3    spironolactone-hydrochlorothiazide (ALDACTAZIDE) 25-25 MG per tablet TAKE 1/2 TABLET DAILY 45 tablet 2    potassium chloride (KLOR-CON) 8 MEQ extended release tablet TAKE 1 TABLET BY MOUTH EVERY DAY 90 tablet 3    omeprazole (PRILOSEC) 20 MG delayed release capsule TAKE ONE CAPSULE BY MOUTH EVERY DAY 90 capsule 1    lovastatin (MEVACOR) 20 MG tablet TAKE 1 TABLET BY MOUTH EVERY DAY 90 tablet 2    magnesium oxide (MAG-OX) 400 (240 Mg) MG tablet TAKE 1 TABLET BY MOUTH EVERY DAY 90 tablet 2    carvedilol (COREG) 25 MG tablet TAKE 1 TABLET BY MOUTH 2 TIMES DAILY 180 tablet 3    amLODIPine (NORVASC) 5 MG tablet Take 1 tablet by mouth daily 90 tablet 3    enalapril (VASOTEC) 20 MG tablet Take 1 tablet by mouth daily 1/2 tablet po daily (Patient taking differently: Take 20 mg by mouth daily ) 90 tablet 2    Multiple Vitamins-Minerals (THERAPEUTIC MULTIVITAMIN-MINERALS) tablet Take 1 tablet by mouth daily      sucralfate (CARAFATE) 1 GM tablet   Take 1 g by mouth 4 times daily        No current facility-administered medications for this visit. Review of Systems:   Review of Systems   Constitutional: Negative.   Negative for diaphoresis and fatigue. HENT: Negative. Eyes: Negative. Respiratory: Negative. Negative for cough, chest tightness, shortness of breath, wheezing and stridor. Cardiovascular: Negative. Negative for chest pain, palpitations and leg swelling. Gastrointestinal: Negative. Negative for blood in stool and nausea. Genitourinary: Negative. Musculoskeletal: Negative. Skin: Negative. Neurological: Negative. Negative for dizziness, syncope, weakness and light-headedness. Hematological: Negative. Psychiatric/Behavioral: Negative. Physical Examination:    /87 (Site: Left Upper Arm, Position: Sitting, Cuff Size: Medium Adult)   Pulse 75   Temp 97.8 °F (36.6 °C)   Resp 18   Ht 5' 4\" (1.626 m)   Wt 145 lb (65.8 kg)   SpO2 96%   BMI 24.89 kg/m²    Physical Exam   Constitutional: He appears healthy. No distress. HENT:   Normal cephalic and Atraumatic   Eyes: Pupils are equal, round, and reactive to light. Neck: Normal range of motion and thyroid normal. Neck supple. No JVD present. No neck adenopathy. No thyromegaly present. Cardiovascular: Normal rate, regular rhythm, intact distal pulses and normal pulses. Murmur heard. Pulmonary/Chest: Effort normal and breath sounds normal. He has no wheezes. He has no rales. He exhibits no tenderness. Abdominal: Soft. Bowel sounds are normal. There is no tenderness. Musculoskeletal: Normal range of motion. He exhibits no edema or tenderness. Neurological: He is alert and oriented to person, place, and time. Skin: Skin is warm. No cyanosis. Nails show no clubbing.        LABS:  CBC:   Lab Results   Component Value Date    WBC 6.2 10/05/2018    RBC 4.27 10/05/2018    HGB 15.3 10/05/2018    HCT 44.9 10/05/2018    .2 10/05/2018    MCH 35.8 10/05/2018    MCHC 34.0 10/05/2018    RDW 13.7 10/05/2018     10/05/2018    MPV 7.7 05/16/2013     Lipids:  Lab Results   Component Value Date    CHOL 175 10/05/2018    CHOL 193 05/16/2013     Lab Results   Component Value Date    TRIG 138 10/05/2018    TRIG 189 (H) 05/16/2013     Lab Results   Component Value Date    HDL 46 10/05/2018    HDL 61 (H) 05/16/2013     Lab Results   Component Value Date    LDLCALC 101 10/05/2018    LDLCALC 94 05/16/2013     No results found for: LABVLDL, VLDL  No results found for: CHOLHDLRATIO  CMP:    Lab Results   Component Value Date     10/05/2018    K 4.8 10/05/2018    CL 95 10/05/2018    CO2 22 10/05/2018    BUN 21 10/05/2018    CREATININE 1.25 10/05/2018    GFRAA >60.0 10/05/2018    LABGLOM 55.6 10/05/2018    GLUCOSE 92 10/05/2018    PROT 8.0 10/05/2018    LABALBU 4.3 10/05/2018    CALCIUM 9.5 10/05/2018    BILITOT 1.0 10/05/2018    ALKPHOS 94 10/05/2018    AST 46 10/05/2018    ALT 40 10/05/2018     BMP:    Lab Results   Component Value Date     10/05/2018    K 4.8 10/05/2018    CL 95 10/05/2018    CO2 22 10/05/2018    BUN 21 10/05/2018    LABALBU 4.3 10/05/2018    CREATININE 1.25 10/05/2018    CALCIUM 9.5 10/05/2018    GFRAA >60.0 10/05/2018    LABGLOM 55.6 10/05/2018    GLUCOSE 92 10/05/2018     Magnesium:    Lab Results   Component Value Date    MG 2.2 10/05/2018     TSH:  Lab Results   Component Value Date    TSH 1.620 10/05/2018       Patient Active Problem List   Diagnosis    Elevated prostate specific antigen (PSA)    Hypertension    Hyperlipidemia    CAD (coronary artery disease)    [facility] with [facility] ([facility]    Bilateral carotid bruits    PRADO (dyspnea on exertion)    S/P PTCA (percutaneous transluminal coronary angioplasty)    Essential hypertension    Ischemic cardiomyopathy    Gross hematuria    Hematuria       There are no discontinued medications. Modified Medications    No medications on file       No orders of the defined types were placed in this encounter. Assessment/Plan:    1. Essential hypertension  Stable     2. Pure hypercholesterolemia   statin     3.  Coronary artery disease involving native coronary artery of native heart without angina pectoris  No angina     4. CHF with cardiomyopathy ([address]   class ll    5. PRADO (dyspnea on exertion)       6. Bilateral carotid bruits       7. Ischemic cardiomyopathy       8. Hematuria, unspecified type  F/u Dr. Patricia Hicks       Counseling:  Heart Healthy Lifestyle, Low Salt Diet, Take Precautions to Prevent Falls and Walk Daily    Return in about 3 months (around 7/5/2019) for Cardiovascular care. .      Electronically signed by True Duvall MD on 4/5/2019 at 2:12 PM

## 2019-06-19 DIAGNOSIS — I10 ESSENTIAL HYPERTENSION: ICD-10-CM

## 2019-06-19 RX ORDER — CARVEDILOL 25 MG/1
TABLET ORAL
Qty: 180 TABLET | Refills: 3 | Status: SHIPPED | OUTPATIENT
Start: 2019-06-19 | End: 2020-01-29 | Stop reason: DRUGHIGH

## 2019-07-19 ENCOUNTER — HOSPITAL ENCOUNTER (OUTPATIENT)
Dept: GENERAL RADIOLOGY | Age: 79
Discharge: HOME | End: 2019-07-21
Payer: MEDICARE

## 2019-07-19 DIAGNOSIS — R06.02 SOB (SHORTNESS OF BREATH): ICD-10-CM

## 2019-07-19 PROCEDURE — 71046 X-RAY EXAM CHEST 2 VIEWS: CPT

## 2019-08-10 DIAGNOSIS — I42.9 CHF WITH CARDIOMYOPATHY (HCC): ICD-10-CM

## 2019-08-10 DIAGNOSIS — I50.9 CHF WITH CARDIOMYOPATHY (HCC): ICD-10-CM

## 2019-08-10 DIAGNOSIS — I25.10 CORONARY ARTERY DISEASE INVOLVING NATIVE CORONARY ARTERY OF NATIVE HEART WITHOUT ANGINA PECTORIS: ICD-10-CM

## 2019-08-10 DIAGNOSIS — I10 ESSENTIAL HYPERTENSION: Primary | ICD-10-CM

## 2019-08-13 RX ORDER — LOVASTATIN 20 MG/1
TABLET ORAL
Qty: 90 TABLET | Refills: 2 | Status: SHIPPED | OUTPATIENT
Start: 2019-08-13 | End: 2020-01-29 | Stop reason: ALTCHOICE

## 2019-08-23 ENCOUNTER — OFFICE VISIT (OUTPATIENT)
Dept: CARDIOLOGY CLINIC | Age: 79
End: 2019-08-23
Payer: MEDICARE

## 2019-08-23 VITALS
BODY MASS INDEX: 24.75 KG/M2 | HEIGHT: 64 IN | OXYGEN SATURATION: 98 % | HEART RATE: 62 BPM | WEIGHT: 145 LBS | RESPIRATION RATE: 18 BRPM | DIASTOLIC BLOOD PRESSURE: 82 MMHG | SYSTOLIC BLOOD PRESSURE: 133 MMHG

## 2019-08-23 DIAGNOSIS — I25.10 CORONARY ARTERY DISEASE INVOLVING NATIVE CORONARY ARTERY OF NATIVE HEART WITHOUT ANGINA PECTORIS: ICD-10-CM

## 2019-08-23 DIAGNOSIS — R09.89 DIMINISHED PULSES IN LOWER EXTREMITY: ICD-10-CM

## 2019-08-23 DIAGNOSIS — E78.00 PURE HYPERCHOLESTEROLEMIA: ICD-10-CM

## 2019-08-23 DIAGNOSIS — I42.9 CHF WITH CARDIOMYOPATHY (HCC): ICD-10-CM

## 2019-08-23 DIAGNOSIS — I50.9 CHF WITH CARDIOMYOPATHY (HCC): ICD-10-CM

## 2019-08-23 DIAGNOSIS — I10 ESSENTIAL HYPERTENSION: Primary | ICD-10-CM

## 2019-08-23 PROCEDURE — 4040F PNEUMOC VAC/ADMIN/RCVD: CPT | Performed by: INTERNAL MEDICINE

## 2019-08-23 PROCEDURE — 93000 ELECTROCARDIOGRAM COMPLETE: CPT | Performed by: INTERNAL MEDICINE

## 2019-08-23 PROCEDURE — G8420 CALC BMI NORM PARAMETERS: HCPCS | Performed by: INTERNAL MEDICINE

## 2019-08-23 PROCEDURE — G8427 DOCREV CUR MEDS BY ELIG CLIN: HCPCS | Performed by: INTERNAL MEDICINE

## 2019-08-23 PROCEDURE — 1036F TOBACCO NON-USER: CPT | Performed by: INTERNAL MEDICINE

## 2019-08-23 PROCEDURE — 99214 OFFICE O/P EST MOD 30 MIN: CPT | Performed by: INTERNAL MEDICINE

## 2019-08-23 PROCEDURE — G8598 ASA/ANTIPLAT THER USED: HCPCS | Performed by: INTERNAL MEDICINE

## 2019-08-23 PROCEDURE — 1123F ACP DISCUSS/DSCN MKR DOCD: CPT | Performed by: INTERNAL MEDICINE

## 2019-08-23 ASSESSMENT — ENCOUNTER SYMPTOMS
COUGH: 0
NAUSEA: 0
EYES NEGATIVE: 1
CHEST TIGHTNESS: 0
WHEEZING: 0
GASTROINTESTINAL NEGATIVE: 1
RESPIRATORY NEGATIVE: 1
SHORTNESS OF BREATH: 0
BLOOD IN STOOL: 0
STRIDOR: 0

## 2019-08-23 NOTE — PROGRESS NOTES
Systems:   Review of Systems   Constitutional: Negative. Negative for diaphoresis and fatigue. HENT: Negative. Eyes: Negative. Respiratory: Negative. Negative for cough, chest tightness, shortness of breath, wheezing and stridor. Cardiovascular: Negative. Negative for chest pain, palpitations and leg swelling. Gastrointestinal: Negative. Negative for blood in stool and nausea. Genitourinary: Positive for hematuria. Musculoskeletal: Negative. Skin: Negative. Neurological: Negative. Negative for dizziness, syncope, weakness and light-headedness. Hematological: Negative. Psychiatric/Behavioral: Negative. Physical Examination:    /82 (Site: Right Upper Arm, Position: Sitting, Cuff Size: Medium Adult)   Pulse 62   Resp 18   Ht 5' 4\" (1.626 m)   Wt 145 lb (65.8 kg)   SpO2 98%   BMI 24.89 kg/m²    Physical Exam   Constitutional: He appears healthy. No distress. HENT:   Normal cephalic and Atraumatic   Eyes: Pupils are equal, round, and reactive to light. Neck: Normal range of motion and thyroid normal. Neck supple. No JVD present. No neck adenopathy. No thyromegaly present. Cardiovascular: Normal rate and regular rhythm. Exam reveals decreased pulses. Murmur heard. Pulmonary/Chest: Effort normal and breath sounds normal. He has no wheezes. He has no rales. He exhibits no tenderness. Abdominal: Soft. Bowel sounds are normal. There is no tenderness. Musculoskeletal: Normal range of motion. He exhibits no edema or tenderness. Neurological: He is alert and oriented to person, place, and time. Skin: Skin is warm. No cyanosis. Nails show no clubbing.        LABS:  CBC:   Lab Results   Component Value Date    WBC 6.2 10/05/2018    RBC 4.27 10/05/2018    HGB 15.3 10/05/2018    HCT 44.9 10/05/2018    .2 10/05/2018    MCH 35.8 10/05/2018    MCHC 34.0 10/05/2018    RDW 13.7 10/05/2018     10/05/2018    MPV 7.7 05/16/2013     Lipids:  Lab Results Component Value Date    CHOL 175 10/05/2018    CHOL 193 05/16/2013     Lab Results   Component Value Date    TRIG 138 10/05/2018    TRIG 189 (H) 05/16/2013     Lab Results   Component Value Date    HDL 46 10/05/2018    HDL 61 (H) 05/16/2013     Lab Results   Component Value Date    LDLCALC 101 10/05/2018    LDLCALC 94 05/16/2013     No results found for: LABVLDL, VLDL  No results found for: CHOLHDLRATIO  CMP:    Lab Results   Component Value Date     10/05/2018    K 4.8 10/05/2018    CL 95 10/05/2018    CO2 22 10/05/2018    BUN 21 10/05/2018    CREATININE 1.25 10/05/2018    GFRAA >60.0 10/05/2018    LABGLOM 55.6 10/05/2018    GLUCOSE 92 10/05/2018    PROT 8.0 10/05/2018    LABALBU 4.3 10/05/2018    CALCIUM 9.5 10/05/2018    BILITOT 1.0 10/05/2018    ALKPHOS 94 10/05/2018    AST 46 10/05/2018    ALT 40 10/05/2018     BMP:    Lab Results   Component Value Date     10/05/2018    K 4.8 10/05/2018    CL 95 10/05/2018    CO2 22 10/05/2018    BUN 21 10/05/2018    LABALBU 4.3 10/05/2018    CREATININE 1.25 10/05/2018    CALCIUM 9.5 10/05/2018    GFRAA >60.0 10/05/2018    LABGLOM 55.6 10/05/2018    GLUCOSE 92 10/05/2018     Magnesium:    Lab Results   Component Value Date    MG 2.2 10/05/2018     TSH:  Lab Results   Component Value Date    TSH 1.620 10/05/2018       Patient Active Problem List   Diagnosis    Elevated prostate specific antigen (PSA)    Hypertension    Hyperlipidemia    CAD (coronary artery disease)    [facility] with [facility] ([facility] [address] [facility]    Bilateral carotid bruits    PRADO (dyspnea on exertion)    S/P PTCA (percutaneous transluminal coronary angioplasty)    Essential hypertension    Ischemic cardiomyopathy    Gross hematuria    Hematuria       There are no discontinued medications. Modified Medications    No medications on file       No orders of the defined types were placed in this encounter. Assessment/Plan:    1. Essential hypertension   stable   - EKG 12 lead    2.  Pure

## 2019-08-27 ENCOUNTER — OFFICE VISIT (OUTPATIENT)
Dept: UROLOGY | Age: 79
End: 2019-08-27

## 2019-08-27 DIAGNOSIS — R31.9 HEMATURIA, UNSPECIFIED TYPE: Primary | ICD-10-CM

## 2019-08-27 PROCEDURE — 99024 POSTOP FOLLOW-UP VISIT: CPT | Performed by: UROLOGY

## 2019-08-29 ENCOUNTER — OFFICE VISIT (OUTPATIENT)
Dept: UROLOGY | Age: 79
End: 2019-08-29
Payer: MEDICARE

## 2019-08-29 VITALS
HEIGHT: 65 IN | BODY MASS INDEX: 24.16 KG/M2 | SYSTOLIC BLOOD PRESSURE: 102 MMHG | DIASTOLIC BLOOD PRESSURE: 60 MMHG | WEIGHT: 145 LBS | HEART RATE: 67 BPM

## 2019-08-29 DIAGNOSIS — R31.0 GROSS HEMATURIA: Primary | ICD-10-CM

## 2019-08-29 LAB
BILIRUBIN, POC: ABNORMAL
BLOOD URINE, POC: ABNORMAL
CLARITY, POC: ABNORMAL
COLOR, POC: YELLOW
GLUCOSE URINE, POC: ABNORMAL
KETONES, POC: ABNORMAL
LEUKOCYTE EST, POC: ABNORMAL
NITRITE, POC: POSITIVE
PH, POC: 7
PROTEIN, POC: ABNORMAL
SPECIFIC GRAVITY, POC: 1.01
UROBILINOGEN, POC: 0.2

## 2019-08-29 PROCEDURE — 1123F ACP DISCUSS/DSCN MKR DOCD: CPT | Performed by: UROLOGY

## 2019-08-29 PROCEDURE — G8427 DOCREV CUR MEDS BY ELIG CLIN: HCPCS | Performed by: UROLOGY

## 2019-08-29 PROCEDURE — 1036F TOBACCO NON-USER: CPT | Performed by: UROLOGY

## 2019-08-29 PROCEDURE — 99214 OFFICE O/P EST MOD 30 MIN: CPT | Performed by: UROLOGY

## 2019-08-29 PROCEDURE — G8420 CALC BMI NORM PARAMETERS: HCPCS | Performed by: UROLOGY

## 2019-08-29 PROCEDURE — G8598 ASA/ANTIPLAT THER USED: HCPCS | Performed by: UROLOGY

## 2019-08-29 PROCEDURE — 4040F PNEUMOC VAC/ADMIN/RCVD: CPT | Performed by: UROLOGY

## 2019-08-29 PROCEDURE — 81003 URINALYSIS AUTO W/O SCOPE: CPT | Performed by: UROLOGY

## 2019-08-29 RX ORDER — BENZONATATE 200 MG/1
CAPSULE ORAL
Refills: 0 | COMMUNITY
Start: 2019-07-19 | End: 2020-01-29 | Stop reason: ALTCHOICE

## 2019-08-29 RX ORDER — ETODOLAC 400 MG/1
TABLET, FILM COATED ORAL
Refills: 0 | COMMUNITY
Start: 2019-07-19 | End: 2020-01-29 | Stop reason: ALTCHOICE

## 2019-08-29 RX ORDER — CIPROFLOXACIN 500 MG/1
500 TABLET, FILM COATED ORAL 2 TIMES DAILY
Qty: 14 TABLET | Refills: 0 | Status: SHIPPED | OUTPATIENT
Start: 2019-08-29 | End: 2019-11-15

## 2019-08-29 NOTE — PROGRESS NOTES
Chaperone for Intimate Exam    1. Was chaperone offered as part of the rooming process? offered, declined   2. If Chaperone is declined by patient, NA: chaperone was available and exam completed  3.  Chaperone is n/a
(ALDACTAZIDE) 25-25 MG per tablet TAKE 1/2 TABLET DAILY 45 tablet 2    potassium chloride (KLOR-CON) 8 MEQ extended release tablet TAKE 1 TABLET BY MOUTH EVERY DAY 90 tablet 3    magnesium oxide (MAG-OX) 400 (240 Mg) MG tablet TAKE 1 TABLET BY MOUTH EVERY DAY 90 tablet 2    amLODIPine (NORVASC) 5 MG tablet Take 1 tablet by mouth daily 90 tablet 3    enalapril (VASOTEC) 20 MG tablet Take 1 tablet by mouth daily 1/2 tablet po daily (Patient taking differently: Take 20 mg by mouth daily ) 90 tablet 2    Multiple Vitamins-Minerals (THERAPEUTIC MULTIVITAMIN-MINERALS) tablet Take 1 tablet by mouth daily      sucralfate (CARAFATE) 1 GM tablet   Take 1 g by mouth 4 times daily        No current facility-administered medications for this visit. Pcn [penicillins]  All reviewed and verified by Dr Jesus Prince on today's visit    Diagnostic Psa   Date Value Ref Range Status   03/18/2014 4.91 0.00 - 6.22 ng/mL Final   05/24/2013 3.5 0.0 - 4.0 ng/mL Final     Results for POC orders placed in visit on 08/29/19   POCT Urinalysis No Micro (Auto)   Result Value Ref Range    Color, UA yellow     Clarity, UA slightly cloudy     Glucose, UA POC neg     Bilirubin, UA neg     Ketones, UA neg     Spec Grav, UA 1.015     Blood, UA POC large     pH, UA 7.0     Protein, UA  mg/dL     Urobilinogen, UA 0.2     Leukocytes, UA large     Nitrite, UA positive        Physical Exam  Vitals:    08/29/19 0830 08/29/19 0831   BP:  102/60   Pulse:  67   Weight: 145 lb (65.8 kg)    Height: 5' 5\" (1.651 m)      Constitutional: patient is oriented to person, place, and time. patient appears well-developed. Not in distress. Ears: Adequate hearing/no hearing loss  Head: Normocephalic. Atraumatic  Neck: Normal range of motion. Cardiovascular: Normal rate, BP reviewed. Normal  Pulmonary/Chest: Normal respiratory effort Not short of breath  Abdominal: Not distended.   No suprapubic discomfort  Urologic Exam  Urinalysis is nitrite

## 2019-09-01 LAB
ORGANISM: ABNORMAL
URINE CULTURE, ROUTINE: ABNORMAL

## 2019-09-06 ENCOUNTER — HOSPITAL ENCOUNTER (OUTPATIENT)
Dept: ULTRASOUND IMAGING | Age: 79
Discharge: HOME | End: 2019-09-08
Payer: MEDICARE

## 2019-09-06 DIAGNOSIS — R09.89 DIMINISHED PULSES IN LOWER EXTREMITY: ICD-10-CM

## 2019-09-06 PROCEDURE — 93923 UPR/LXTR ART STDY 3+ LVLS: CPT | Performed by: INTERNAL MEDICINE

## 2019-09-06 PROCEDURE — 93923 UPR/LXTR ART STDY 3+ LVLS: CPT

## 2019-09-13 ENCOUNTER — OFFICE VISIT (OUTPATIENT)
Dept: UROLOGY | Age: 79
End: 2019-09-13
Payer: MEDICARE

## 2019-09-13 VITALS
HEIGHT: 65 IN | WEIGHT: 145 LBS | DIASTOLIC BLOOD PRESSURE: 72 MMHG | HEART RATE: 71 BPM | SYSTOLIC BLOOD PRESSURE: 118 MMHG | BODY MASS INDEX: 24.16 KG/M2

## 2019-09-13 DIAGNOSIS — R31.0 GROSS HEMATURIA: Primary | ICD-10-CM

## 2019-09-13 LAB
BILIRUBIN, POC: ABNORMAL
BLOOD URINE, POC: ABNORMAL
CLARITY, POC: CLEAR
COLOR, POC: YELLOW
GLUCOSE URINE, POC: ABNORMAL
KETONES, POC: ABNORMAL
LEUKOCYTE EST, POC: ABNORMAL
NITRITE, POC: ABNORMAL
PH, POC: 6.5
PROTEIN, POC: 100
SPECIFIC GRAVITY, POC: 1.02
UROBILINOGEN, POC: 1

## 2019-09-13 PROCEDURE — G8427 DOCREV CUR MEDS BY ELIG CLIN: HCPCS | Performed by: UROLOGY

## 2019-09-13 PROCEDURE — 1036F TOBACCO NON-USER: CPT | Performed by: UROLOGY

## 2019-09-13 PROCEDURE — 99213 OFFICE O/P EST LOW 20 MIN: CPT | Performed by: UROLOGY

## 2019-09-13 PROCEDURE — G8598 ASA/ANTIPLAT THER USED: HCPCS | Performed by: UROLOGY

## 2019-09-13 PROCEDURE — 4040F PNEUMOC VAC/ADMIN/RCVD: CPT | Performed by: UROLOGY

## 2019-09-13 PROCEDURE — 81003 URINALYSIS AUTO W/O SCOPE: CPT | Performed by: UROLOGY

## 2019-09-13 PROCEDURE — 1123F ACP DISCUSS/DSCN MKR DOCD: CPT | Performed by: UROLOGY

## 2019-09-13 PROCEDURE — G8420 CALC BMI NORM PARAMETERS: HCPCS | Performed by: UROLOGY

## 2019-09-13 NOTE — PROGRESS NOTES
[facility] EVALUATION NOTE                                                 H&P                                                                                                                                                 Reason for Visit  Hematuria secondary to UTI    History of Present Illness  Urine is cleared since taking antibiotic for acute UTI  Patient still needs to have cystoscopy which she is deferring      Urologic Review of Systems/Symptoms  Denies hematuria  Denies dysuria  Denies incontinence  Denies flank pain  Other Urologic: No other issues    Review of Systems  Head and neck: No issues/reviewed  Cardiac: No recent issues/reviewed  Pulmonary: No issues/reviewed  Gastrointestinal: No issues/reviewed  Neurologic: No recent issues/reviewed  Extremities: No issues/reviewed  Lymphatics: No lymphadenopathy no change  Genitourinary: See above  Skin: No issues/reviewed  Hospitalization: None recent  Finished antibiotic  All 14 categories of Review of Systems otherwise reviewed no other findings reported. Past Medical History:   Diagnosis Date    Arthritis     Hip and back    Asthma     CAD (coronary artery disease)     [facility] with [facility] ([facility]     GERD (gastroesophageal reflux disease)     History of hemoptysis     Hyperlipidemia     Hypertension 03/19/2010    Interstitial pneumonitis (HCC)     Sinus trouble      Past Surgical History:   Procedure Laterality Date    BRONCHOSCOPY  3/30/2010    COLONOSCOPY      CORONARY ANGIOPLASTY WITH STENT PLACEMENT Left 4/2/2010    with percutaneous coronary intervention of the right coronary artery    TONSILLECTOMY      UPPER GASTROINTESTINAL ENDOSCOPY       Social History     Socioeconomic History    Marital status:       Spouse name: None    Number of children: None    Years of education: None    Highest education level: None   Occupational History    Occupation: Retired     Employer: [employer]   Social Needs    Financial

## 2019-11-15 ENCOUNTER — HOSPITAL ENCOUNTER (OUTPATIENT)
Dept: GENERAL RADIOLOGY | Age: 79
Discharge: HOME | End: 2019-11-17
Payer: MEDICARE

## 2019-11-15 ENCOUNTER — OFFICE VISIT (OUTPATIENT)
Dept: CARDIOLOGY CLINIC | Age: 79
End: 2019-11-15
Payer: MEDICARE

## 2019-11-15 VITALS
BODY MASS INDEX: 24.93 KG/M2 | WEIGHT: 149.8 LBS | HEART RATE: 77 BPM | SYSTOLIC BLOOD PRESSURE: 136 MMHG | DIASTOLIC BLOOD PRESSURE: 78 MMHG | RESPIRATION RATE: 18 BRPM | OXYGEN SATURATION: 97 %

## 2019-11-15 DIAGNOSIS — I10 ESSENTIAL HYPERTENSION: ICD-10-CM

## 2019-11-15 DIAGNOSIS — I25.10 CORONARY ARTERY DISEASE INVOLVING NATIVE CORONARY ARTERY OF NATIVE HEART WITHOUT ANGINA PECTORIS: ICD-10-CM

## 2019-11-15 DIAGNOSIS — R09.89 BILATERAL CAROTID BRUITS: ICD-10-CM

## 2019-11-15 DIAGNOSIS — E78.00 PURE HYPERCHOLESTEROLEMIA: ICD-10-CM

## 2019-11-15 DIAGNOSIS — I25.5 ISCHEMIC CARDIOMYOPATHY: ICD-10-CM

## 2019-11-15 DIAGNOSIS — R07.9 CHEST PAIN, UNSPECIFIED TYPE: ICD-10-CM

## 2019-11-15 DIAGNOSIS — I50.9 CHF WITH CARDIOMYOPATHY (HCC): Primary | ICD-10-CM

## 2019-11-15 DIAGNOSIS — I42.9 CHF WITH CARDIOMYOPATHY (HCC): Primary | ICD-10-CM

## 2019-11-15 DIAGNOSIS — R06.09 DOE (DYSPNEA ON EXERTION): ICD-10-CM

## 2019-11-15 PROCEDURE — G8420 CALC BMI NORM PARAMETERS: HCPCS | Performed by: INTERNAL MEDICINE

## 2019-11-15 PROCEDURE — 71046 X-RAY EXAM CHEST 2 VIEWS: CPT

## 2019-11-15 PROCEDURE — G8427 DOCREV CUR MEDS BY ELIG CLIN: HCPCS | Performed by: INTERNAL MEDICINE

## 2019-11-15 PROCEDURE — 1123F ACP DISCUSS/DSCN MKR DOCD: CPT | Performed by: INTERNAL MEDICINE

## 2019-11-15 PROCEDURE — G8598 ASA/ANTIPLAT THER USED: HCPCS | Performed by: INTERNAL MEDICINE

## 2019-11-15 PROCEDURE — 1036F TOBACCO NON-USER: CPT | Performed by: INTERNAL MEDICINE

## 2019-11-15 PROCEDURE — 4040F PNEUMOC VAC/ADMIN/RCVD: CPT | Performed by: INTERNAL MEDICINE

## 2019-11-15 PROCEDURE — G8484 FLU IMMUNIZE NO ADMIN: HCPCS | Performed by: INTERNAL MEDICINE

## 2019-11-15 PROCEDURE — 99214 OFFICE O/P EST MOD 30 MIN: CPT | Performed by: INTERNAL MEDICINE

## 2019-11-15 ASSESSMENT — ENCOUNTER SYMPTOMS
CHEST TIGHTNESS: 0
SHORTNESS OF BREATH: 0
WHEEZING: 0
EYES NEGATIVE: 1
GASTROINTESTINAL NEGATIVE: 1
RESPIRATORY NEGATIVE: 1
NAUSEA: 0
BLOOD IN STOOL: 0
STRIDOR: 0
COUGH: 0

## 2019-12-09 ENCOUNTER — APPOINTMENT (OUTPATIENT)
Dept: ULTRASOUND IMAGING | Age: 79
DRG: 065 | End: 2019-12-09
Payer: MEDICARE

## 2019-12-09 ENCOUNTER — APPOINTMENT (OUTPATIENT)
Dept: MRI IMAGING | Age: 79
DRG: 065 | End: 2019-12-09
Payer: MEDICARE

## 2019-12-09 ENCOUNTER — HOSPITAL ENCOUNTER (INPATIENT)
Age: 79
LOS: 1 days | Discharge: ANOTHER HOSPITAL | DRG: 065 | End: 2019-12-10
Attending: EMERGENCY MEDICINE | Admitting: INTERNAL MEDICINE
Payer: MEDICARE

## 2019-12-09 ENCOUNTER — APPOINTMENT (OUTPATIENT)
Dept: GENERAL RADIOLOGY | Age: 79
DRG: 065 | End: 2019-12-09
Payer: MEDICARE

## 2019-12-09 ENCOUNTER — APPOINTMENT (OUTPATIENT)
Dept: CT IMAGING | Age: 79
DRG: 065 | End: 2019-12-09
Payer: MEDICARE

## 2019-12-09 DIAGNOSIS — R41.82 ALTERED MENTAL STATUS, UNSPECIFIED ALTERED MENTAL STATUS TYPE: Primary | ICD-10-CM

## 2019-12-09 DIAGNOSIS — R47.01 APHASIA: ICD-10-CM

## 2019-12-09 DIAGNOSIS — R29.818 NEUROLOGIC DEFICIT DUE TO ACUTE ISCHEMIC CEREBROVASCULAR ACCIDENT (CVA) (HCC): ICD-10-CM

## 2019-12-09 DIAGNOSIS — I63.9 NEUROLOGIC DEFICIT DUE TO ACUTE ISCHEMIC CEREBROVASCULAR ACCIDENT (CVA) (HCC): ICD-10-CM

## 2019-12-09 PROBLEM — R29.90 STROKE-LIKE EPISODE: Status: ACTIVE | Noted: 2019-12-09

## 2019-12-09 LAB
ALBUMIN SERPL-MCNC: 4 G/DL (ref 3.5–4.6)
ALP BLD-CCNC: 117 U/L (ref 35–104)
ALT SERPL-CCNC: 23 U/L (ref 0–41)
ANION GAP SERPL CALCULATED.3IONS-SCNC: 17 MEQ/L (ref 9–15)
AST SERPL-CCNC: 37 U/L (ref 0–40)
BILIRUB SERPL-MCNC: 0.7 MG/DL (ref 0.2–0.7)
BUN BLDV-MCNC: 35 MG/DL (ref 8–23)
CALCIUM SERPL-MCNC: 9.5 MG/DL (ref 8.5–9.9)
CHLORIDE BLD-SCNC: 94 MEQ/L (ref 95–107)
CO2: 20 MEQ/L (ref 20–31)
CREAT SERPL-MCNC: 1.78 MG/DL (ref 0.7–1.2)
EKG ATRIAL RATE: 66 BPM
EKG P AXIS: 70 DEGREES
EKG P-R INTERVAL: 186 MS
EKG Q-T INTERVAL: 442 MS
EKG QRS DURATION: 122 MS
EKG QTC CALCULATION (BAZETT): 463 MS
EKG R AXIS: -24 DEGREES
EKG T AXIS: 25 DEGREES
EKG VENTRICULAR RATE: 66 BPM
ETHANOL PERCENT: NORMAL G/DL
ETHANOL: <10 MG/DL (ref 0–0.08)
FOLATE: >20 NG/ML (ref 7.3–26.1)
GFR AFRICAN AMERICAN: 44.6
GFR NON-AFRICAN AMERICAN: 36.9
GLOBULIN: 5.3 G/DL (ref 2.3–3.5)
GLUCOSE BLD-MCNC: 76 MG/DL (ref 70–99)
GLUCOSE BLD-MCNC: 92 MG/DL (ref 60–115)
HCT VFR BLD CALC: 45.1 % (ref 42–52)
HEMOGLOBIN: 14.8 G/DL (ref 14–18)
MAGNESIUM: 2.4 MG/DL (ref 1.7–2.4)
MCH RBC QN AUTO: 33.6 PG (ref 27–31.3)
MCHC RBC AUTO-ENTMCNC: 32.9 % (ref 33–37)
MCV RBC AUTO: 102.1 FL (ref 80–100)
PDW BLD-RTO: 14.4 % (ref 11.5–14.5)
PERFORMED ON: NORMAL
PLATELET # BLD: 354 K/UL (ref 130–400)
POTASSIUM SERPL-SCNC: 4.8 MEQ/L (ref 3.4–4.9)
RBC # BLD: 4.41 M/UL (ref 4.7–6.1)
REASON FOR REJECTION: NORMAL
REJECTED TEST: NORMAL
SODIUM BLD-SCNC: 131 MEQ/L (ref 135–144)
T4 FREE: 1.16 NG/DL (ref 0.84–1.68)
TOTAL PROTEIN: 9.3 G/DL (ref 6.3–8)
TROPONIN: <0.01 NG/ML (ref 0–0.01)
TSH SERPL DL<=0.05 MIU/L-ACNC: 0.93 UIU/ML (ref 0.44–3.86)
VITAMIN B-12: >2000 PG/ML (ref 232–1245)
VITAMIN D 25-HYDROXY: 41.7 NG/ML (ref 30–100)
WBC # BLD: 6.5 K/UL (ref 4.8–10.8)

## 2019-12-09 PROCEDURE — 87077 CULTURE AEROBIC IDENTIFY: CPT

## 2019-12-09 PROCEDURE — 85027 COMPLETE CBC AUTOMATED: CPT

## 2019-12-09 PROCEDURE — 92610 EVALUATE SWALLOWING FUNCTION: CPT

## 2019-12-09 PROCEDURE — 82746 ASSAY OF FOLIC ACID SERUM: CPT

## 2019-12-09 PROCEDURE — 1210000000 HC MED SURG R&B

## 2019-12-09 PROCEDURE — 84443 ASSAY THYROID STIM HORMONE: CPT

## 2019-12-09 PROCEDURE — 6360000002 HC RX W HCPCS: Performed by: INTERNAL MEDICINE

## 2019-12-09 PROCEDURE — 92523 SPEECH SOUND LANG COMPREHEN: CPT

## 2019-12-09 PROCEDURE — 2580000003 HC RX 258: Performed by: EMERGENCY MEDICINE

## 2019-12-09 PROCEDURE — 6370000000 HC RX 637 (ALT 250 FOR IP): Performed by: NURSE PRACTITIONER

## 2019-12-09 PROCEDURE — 84484 ASSAY OF TROPONIN QUANT: CPT

## 2019-12-09 PROCEDURE — 6360000002 HC RX W HCPCS: Performed by: NURSE PRACTITIONER

## 2019-12-09 PROCEDURE — 83735 ASSAY OF MAGNESIUM: CPT

## 2019-12-09 PROCEDURE — 70547 MR ANGIOGRAPHY NECK W/O DYE: CPT

## 2019-12-09 PROCEDURE — 80053 COMPREHEN METABOLIC PANEL: CPT

## 2019-12-09 PROCEDURE — 97167 OT EVAL HIGH COMPLEX 60 MIN: CPT

## 2019-12-09 PROCEDURE — 82306 VITAMIN D 25 HYDROXY: CPT

## 2019-12-09 PROCEDURE — 93005 ELECTROCARDIOGRAM TRACING: CPT | Performed by: EMERGENCY MEDICINE

## 2019-12-09 PROCEDURE — 93880 EXTRACRANIAL BILAT STUDY: CPT

## 2019-12-09 PROCEDURE — 97162 PT EVAL MOD COMPLEX 30 MIN: CPT

## 2019-12-09 PROCEDURE — 84439 ASSAY OF FREE THYROXINE: CPT

## 2019-12-09 PROCEDURE — 2580000003 HC RX 258: Performed by: NURSE PRACTITIONER

## 2019-12-09 PROCEDURE — 70551 MRI BRAIN STEM W/O DYE: CPT

## 2019-12-09 PROCEDURE — 70450 CT HEAD/BRAIN W/O DYE: CPT

## 2019-12-09 PROCEDURE — 2580000003 HC RX 258: Performed by: INTERNAL MEDICINE

## 2019-12-09 PROCEDURE — 82607 VITAMIN B-12: CPT

## 2019-12-09 PROCEDURE — G0480 DRUG TEST DEF 1-7 CLASSES: HCPCS

## 2019-12-09 PROCEDURE — 99285 EMERGENCY DEPT VISIT HI MDM: CPT

## 2019-12-09 PROCEDURE — 36415 COLL VENOUS BLD VENIPUNCTURE: CPT

## 2019-12-09 PROCEDURE — 87086 URINE CULTURE/COLONY COUNT: CPT

## 2019-12-09 PROCEDURE — 70544 MR ANGIOGRAPHY HEAD W/O DYE: CPT

## 2019-12-09 PROCEDURE — 71045 X-RAY EXAM CHEST 1 VIEW: CPT

## 2019-12-09 PROCEDURE — 6370000000 HC RX 637 (ALT 250 FOR IP): Performed by: EMERGENCY MEDICINE

## 2019-12-09 RX ORDER — CLOPIDOGREL BISULFATE 75 MG/1
75 TABLET ORAL DAILY
Status: DISCONTINUED | OUTPATIENT
Start: 2019-12-09 | End: 2019-12-10 | Stop reason: HOSPADM

## 2019-12-09 RX ORDER — PANTOPRAZOLE SODIUM 40 MG/1
40 TABLET, DELAYED RELEASE ORAL
Status: DISCONTINUED | OUTPATIENT
Start: 2019-12-10 | End: 2019-12-10 | Stop reason: HOSPADM

## 2019-12-09 RX ORDER — SODIUM CHLORIDE 0.9 % (FLUSH) 0.9 %
10 SYRINGE (ML) INJECTION PRN
Status: DISCONTINUED | OUTPATIENT
Start: 2019-12-09 | End: 2019-12-10 | Stop reason: HOSPADM

## 2019-12-09 RX ORDER — ACETAMINOPHEN 325 MG/1
650 TABLET ORAL EVERY 4 HOURS PRN
Status: DISCONTINUED | OUTPATIENT
Start: 2019-12-09 | End: 2019-12-10 | Stop reason: HOSPADM

## 2019-12-09 RX ORDER — SIMVASTATIN 40 MG
40 TABLET ORAL NIGHTLY
Status: DISCONTINUED | OUTPATIENT
Start: 2019-12-09 | End: 2019-12-10 | Stop reason: HOSPADM

## 2019-12-09 RX ORDER — SODIUM CHLORIDE 9 MG/ML
INJECTION, SOLUTION INTRAVENOUS CONTINUOUS
Status: DISCONTINUED | OUTPATIENT
Start: 2019-12-09 | End: 2019-12-10 | Stop reason: HOSPADM

## 2019-12-09 RX ORDER — M-VIT,TX,IRON,MINS/CALC/FOLIC 27MG-0.4MG
1 TABLET ORAL DAILY
Status: DISCONTINUED | OUTPATIENT
Start: 2019-12-09 | End: 2019-12-10 | Stop reason: HOSPADM

## 2019-12-09 RX ORDER — ASPIRIN 300 MG/1
300 SUPPOSITORY RECTAL ONCE
Status: COMPLETED | OUTPATIENT
Start: 2019-12-09 | End: 2019-12-09

## 2019-12-09 RX ORDER — ASPIRIN 300 MG/1
300 SUPPOSITORY RECTAL DAILY
Status: DISCONTINUED | OUTPATIENT
Start: 2019-12-10 | End: 2019-12-10 | Stop reason: HOSPADM

## 2019-12-09 RX ORDER — SUCRALFATE 1 G/1
1 TABLET ORAL 4 TIMES DAILY
Status: DISCONTINUED | OUTPATIENT
Start: 2019-12-09 | End: 2019-12-10 | Stop reason: HOSPADM

## 2019-12-09 RX ORDER — HYDRALAZINE HYDROCHLORIDE 20 MG/ML
10 INJECTION INTRAMUSCULAR; INTRAVENOUS EVERY 6 HOURS PRN
Status: DISCONTINUED | OUTPATIENT
Start: 2019-12-09 | End: 2019-12-10 | Stop reason: HOSPADM

## 2019-12-09 RX ORDER — ONDANSETRON 2 MG/ML
4 INJECTION INTRAMUSCULAR; INTRAVENOUS EVERY 6 HOURS PRN
Status: DISCONTINUED | OUTPATIENT
Start: 2019-12-09 | End: 2019-12-10 | Stop reason: HOSPADM

## 2019-12-09 RX ORDER — ASPIRIN 81 MG/1
81 TABLET ORAL DAILY
Status: DISCONTINUED | OUTPATIENT
Start: 2019-12-10 | End: 2019-12-10 | Stop reason: HOSPADM

## 2019-12-09 RX ORDER — THIAMINE HYDROCHLORIDE 100 MG/ML
300 INJECTION, SOLUTION INTRAMUSCULAR; INTRAVENOUS DAILY
Status: DISCONTINUED | OUTPATIENT
Start: 2019-12-09 | End: 2019-12-09 | Stop reason: CLARIF

## 2019-12-09 RX ORDER — SODIUM CHLORIDE 0.9 % (FLUSH) 0.9 %
10 SYRINGE (ML) INJECTION EVERY 12 HOURS SCHEDULED
Status: DISCONTINUED | OUTPATIENT
Start: 2019-12-09 | End: 2019-12-10 | Stop reason: HOSPADM

## 2019-12-09 RX ORDER — SODIUM CHLORIDE 9 MG/ML
INJECTION, SOLUTION INTRAVENOUS CONTINUOUS
Status: ACTIVE | OUTPATIENT
Start: 2019-12-09 | End: 2019-12-10

## 2019-12-09 RX ORDER — LANOLIN ALCOHOL/MO/W.PET/CERES
400 CREAM (GRAM) TOPICAL DAILY
Status: DISCONTINUED | OUTPATIENT
Start: 2019-12-09 | End: 2019-12-10 | Stop reason: HOSPADM

## 2019-12-09 RX ORDER — LORAZEPAM 2 MG/ML
0.5 INJECTION INTRAMUSCULAR DAILY PRN
Status: DISCONTINUED | OUTPATIENT
Start: 2019-12-09 | End: 2019-12-10 | Stop reason: HOSPADM

## 2019-12-09 RX ORDER — BENZONATATE 100 MG/1
200 CAPSULE ORAL EVERY 8 HOURS PRN
Status: DISCONTINUED | OUTPATIENT
Start: 2019-12-09 | End: 2019-12-10 | Stop reason: HOSPADM

## 2019-12-09 RX ORDER — HEPARIN SODIUM 5000 [USP'U]/ML
5000 INJECTION, SOLUTION INTRAVENOUS; SUBCUTANEOUS EVERY 8 HOURS SCHEDULED
Status: DISCONTINUED | OUTPATIENT
Start: 2019-12-09 | End: 2019-12-10 | Stop reason: HOSPADM

## 2019-12-09 RX ORDER — ASPIRIN 300 MG/1
300 SUPPOSITORY RECTAL DAILY
Status: DISCONTINUED | OUTPATIENT
Start: 2019-12-09 | End: 2019-12-09

## 2019-12-09 RX ORDER — ASPIRIN 325 MG
325 TABLET ORAL ONCE
Status: DISCONTINUED | OUTPATIENT
Start: 2019-12-09 | End: 2019-12-09 | Stop reason: ALTCHOICE

## 2019-12-09 RX ORDER — AMLODIPINE BESYLATE 5 MG/1
5 TABLET ORAL DAILY
Status: DISCONTINUED | OUTPATIENT
Start: 2019-12-10 | End: 2019-12-10 | Stop reason: HOSPADM

## 2019-12-09 RX ORDER — CARVEDILOL 25 MG/1
25 TABLET ORAL 2 TIMES DAILY
Status: DISCONTINUED | OUTPATIENT
Start: 2019-12-09 | End: 2019-12-10 | Stop reason: HOSPADM

## 2019-12-09 RX ORDER — ATORVASTATIN CALCIUM 40 MG/1
40 TABLET, FILM COATED ORAL NIGHTLY
Status: CANCELLED | OUTPATIENT
Start: 2019-12-09

## 2019-12-09 RX ORDER — ASPIRIN 81 MG/1
81 TABLET ORAL DAILY
Status: DISCONTINUED | OUTPATIENT
Start: 2019-12-09 | End: 2019-12-09

## 2019-12-09 RX ADMIN — SODIUM CHLORIDE: 9 INJECTION, SOLUTION INTRAVENOUS at 14:15

## 2019-12-09 RX ADMIN — SUCRALFATE 1 G: 1 TABLET ORAL at 20:57

## 2019-12-09 RX ADMIN — HEPARIN SODIUM 5000 UNITS: 5000 INJECTION INTRAVENOUS; SUBCUTANEOUS at 14:28

## 2019-12-09 RX ADMIN — THIAMINE HYDROCHLORIDE: 100 INJECTION, SOLUTION INTRAMUSCULAR; INTRAVENOUS at 14:18

## 2019-12-09 RX ADMIN — SIMVASTATIN 40 MG: 40 TABLET, FILM COATED ORAL at 20:57

## 2019-12-09 RX ADMIN — HEPARIN SODIUM 5000 UNITS: 5000 INJECTION INTRAVENOUS; SUBCUTANEOUS at 22:00

## 2019-12-09 RX ADMIN — Medication 10 ML: at 20:57

## 2019-12-09 RX ADMIN — SODIUM CHLORIDE: 9 INJECTION, SOLUTION INTRAVENOUS at 09:17

## 2019-12-09 RX ADMIN — ASPIRIN 300 MG: 300 SUPPOSITORY RECTAL at 10:29

## 2019-12-09 RX ADMIN — SUCRALFATE 1 G: 1 TABLET ORAL at 18:39

## 2019-12-09 ASSESSMENT — PAIN SCALES - WONG BAKER: WONGBAKER_NUMERICALRESPONSE: 0

## 2019-12-09 ASSESSMENT — ENCOUNTER SYMPTOMS
COLOR CHANGE: 0
SHORTNESS OF BREATH: 0
FACIAL SWELLING: 0
EYE DISCHARGE: 0
RHINORRHEA: 0
ABDOMINAL PAIN: 0
VOMITING: 0

## 2019-12-10 ENCOUNTER — APPOINTMENT (OUTPATIENT)
Dept: GENERAL RADIOLOGY | Age: 79
DRG: 065 | End: 2019-12-10
Payer: MEDICARE

## 2019-12-10 ENCOUNTER — APPOINTMENT (OUTPATIENT)
Dept: CT IMAGING | Age: 79
DRG: 065 | End: 2019-12-10
Payer: MEDICARE

## 2019-12-10 VITALS
TEMPERATURE: 98.2 F | HEART RATE: 69 BPM | WEIGHT: 147 LBS | OXYGEN SATURATION: 96 % | SYSTOLIC BLOOD PRESSURE: 121 MMHG | BODY MASS INDEX: 25.1 KG/M2 | HEIGHT: 64 IN | RESPIRATION RATE: 18 BRPM | DIASTOLIC BLOOD PRESSURE: 62 MMHG

## 2019-12-10 LAB
AMPHETAMINE SCREEN, URINE: NORMAL
ANION GAP SERPL CALCULATED.3IONS-SCNC: 12 MEQ/L (ref 9–15)
ANTI-XA UNFRAC HEPARIN: 1.08 IU/ML
APTT: >150 SEC (ref 24.4–36.8)
BACTERIA: NEGATIVE /HPF
BARBITURATE SCREEN URINE: NORMAL
BENZODIAZEPINE SCREEN, URINE: NORMAL
BILIRUBIN URINE: NEGATIVE
BLOOD, URINE: ABNORMAL
BUN BLDV-MCNC: 24 MG/DL (ref 8–23)
CALCIUM SERPL-MCNC: 8.1 MG/DL (ref 8.5–9.9)
CANNABINOID SCREEN URINE: NORMAL
CHLORIDE BLD-SCNC: 103 MEQ/L (ref 95–107)
CHOLESTEROL, TOTAL: 130 MG/DL (ref 0–199)
CLARITY: CLEAR
CO2: 20 MEQ/L (ref 20–31)
COCAINE METABOLITE SCREEN URINE: NORMAL
COLOR: YELLOW
CREAT SERPL-MCNC: 1.28 MG/DL (ref 0.7–1.2)
CREATININE URINE: 127.2 MG/DL
EPITHELIAL CELLS, UA: ABNORMAL /HPF (ref 0–5)
GFR AFRICAN AMERICAN: >60
GFR NON-AFRICAN AMERICAN: 54
GLUCOSE BLD-MCNC: 84 MG/DL (ref 70–99)
GLUCOSE URINE: NEGATIVE MG/DL
HBA1C MFR BLD: 5.4 % (ref 4.8–5.9)
HCT VFR BLD CALC: 37.9 % (ref 42–52)
HCT VFR BLD CALC: 38.5 % (ref 42–52)
HDLC SERPL-MCNC: 48 MG/DL (ref 40–59)
HEMOGLOBIN: 12.6 G/DL (ref 14–18)
HEMOGLOBIN: 13.1 G/DL (ref 14–18)
HYALINE CASTS: ABNORMAL /HPF (ref 0–5)
INR BLD: 1.2
KETONES, URINE: 40 MG/DL
LDL CHOLESTEROL CALCULATED: 61 MG/DL (ref 0–129)
LEUKOCYTE ESTERASE, URINE: ABNORMAL
LV EF: 60 %
LVEF MODALITY: NORMAL
Lab: NORMAL
MCH RBC QN AUTO: 34.2 PG (ref 27–31.3)
MCH RBC QN AUTO: 34.8 PG (ref 27–31.3)
MCHC RBC AUTO-ENTMCNC: 33.2 % (ref 33–37)
MCHC RBC AUTO-ENTMCNC: 34 % (ref 33–37)
MCV RBC AUTO: 102.2 FL (ref 80–100)
MCV RBC AUTO: 103 FL (ref 80–100)
METHADONE SCREEN, URINE: NORMAL
NITRITE, URINE: POSITIVE
OPIATE SCREEN URINE: NORMAL
OXYCODONE URINE: NORMAL
PDW BLD-RTO: 14.2 % (ref 11.5–14.5)
PDW BLD-RTO: 14.2 % (ref 11.5–14.5)
PH UA: 5 (ref 5–9)
PHENCYCLIDINE SCREEN URINE: NORMAL
PLATELET # BLD: 278 K/UL (ref 130–400)
PLATELET # BLD: 288 K/UL (ref 130–400)
POTASSIUM SERPL-SCNC: 4.1 MEQ/L (ref 3.4–4.9)
PROPOXYPHENE SCREEN: NORMAL
PROTEIN UA: ABNORMAL MG/DL
PROTHROMBIN TIME: 15.6 SEC (ref 12.3–14.9)
RBC # BLD: 3.68 M/UL (ref 4.7–6.1)
RBC # BLD: 3.77 M/UL (ref 4.7–6.1)
RBC UA: ABNORMAL /HPF (ref 0–5)
SODIUM BLD-SCNC: 135 MEQ/L (ref 135–144)
SODIUM URINE: 106 MEQ/L
SPECIFIC GRAVITY UA: 1.02 (ref 1–1.03)
TRIGL SERPL-MCNC: 106 MG/DL (ref 0–150)
URINE REFLEX TO CULTURE: YES
UROBILINOGEN, URINE: 0.2 E.U./DL
WBC # BLD: 5.4 K/UL (ref 4.8–10.8)
WBC # BLD: 5.5 K/UL (ref 4.8–10.8)
WBC UA: ABNORMAL /HPF (ref 0–5)

## 2019-12-10 PROCEDURE — 36415 COLL VENOUS BLD VENIPUNCTURE: CPT

## 2019-12-10 PROCEDURE — 80307 DRUG TEST PRSMV CHEM ANLYZR: CPT

## 2019-12-10 PROCEDURE — 84300 ASSAY OF URINE SODIUM: CPT

## 2019-12-10 PROCEDURE — 85520 HEPARIN ASSAY: CPT

## 2019-12-10 PROCEDURE — 92611 MOTION FLUOROSCOPY/SWALLOW: CPT

## 2019-12-10 PROCEDURE — 70496 CT ANGIOGRAPHY HEAD: CPT

## 2019-12-10 PROCEDURE — 80061 LIPID PANEL: CPT

## 2019-12-10 PROCEDURE — 6360000002 HC RX W HCPCS: Performed by: NURSE PRACTITIONER

## 2019-12-10 PROCEDURE — 81001 URINALYSIS AUTO W/SCOPE: CPT

## 2019-12-10 PROCEDURE — 85730 THROMBOPLASTIN TIME PARTIAL: CPT

## 2019-12-10 PROCEDURE — 95816 EEG AWAKE AND DROWSY: CPT

## 2019-12-10 PROCEDURE — 93306 TTE W/DOPPLER COMPLETE: CPT

## 2019-12-10 PROCEDURE — 2580000003 HC RX 258: Performed by: NURSE PRACTITIONER

## 2019-12-10 PROCEDURE — 97116 GAIT TRAINING THERAPY: CPT

## 2019-12-10 PROCEDURE — 80048 BASIC METABOLIC PNL TOTAL CA: CPT

## 2019-12-10 PROCEDURE — 2500000003 HC RX 250 WO HCPCS: Performed by: INTERNAL MEDICINE

## 2019-12-10 PROCEDURE — 85027 COMPLETE CBC AUTOMATED: CPT

## 2019-12-10 PROCEDURE — 74230 X-RAY XM SWLNG FUNCJ C+: CPT

## 2019-12-10 PROCEDURE — 2580000003 HC RX 258: Performed by: INTERNAL MEDICINE

## 2019-12-10 PROCEDURE — 6360000002 HC RX W HCPCS: Performed by: INTERNAL MEDICINE

## 2019-12-10 PROCEDURE — 6370000000 HC RX 637 (ALT 250 FOR IP): Performed by: NURSE PRACTITIONER

## 2019-12-10 PROCEDURE — 97535 SELF CARE MNGMENT TRAINING: CPT

## 2019-12-10 PROCEDURE — 6360000004 HC RX CONTRAST MEDICATION: Performed by: INTERNAL MEDICINE

## 2019-12-10 PROCEDURE — 85610 PROTHROMBIN TIME: CPT

## 2019-12-10 PROCEDURE — 82570 ASSAY OF URINE CREATININE: CPT

## 2019-12-10 PROCEDURE — 93010 ELECTROCARDIOGRAM REPORT: CPT | Performed by: INTERNAL MEDICINE

## 2019-12-10 PROCEDURE — 83036 HEMOGLOBIN GLYCOSYLATED A1C: CPT

## 2019-12-10 RX ORDER — HEPARIN SODIUM 5000 [USP'U]/ML
60 INJECTION, SOLUTION INTRAVENOUS; SUBCUTANEOUS ONCE
Status: COMPLETED | OUTPATIENT
Start: 2019-12-10 | End: 2019-12-10

## 2019-12-10 RX ORDER — HEPARIN SODIUM 5000 [USP'U]/ML
30 INJECTION, SOLUTION INTRAVENOUS; SUBCUTANEOUS PRN
Status: DISCONTINUED | OUTPATIENT
Start: 2019-12-10 | End: 2019-12-10 | Stop reason: HOSPADM

## 2019-12-10 RX ORDER — HEPARIN SODIUM 5000 [USP'U]/ML
60 INJECTION, SOLUTION INTRAVENOUS; SUBCUTANEOUS PRN
Status: DISCONTINUED | OUTPATIENT
Start: 2019-12-10 | End: 2019-12-10 | Stop reason: HOSPADM

## 2019-12-10 RX ORDER — HEPARIN SODIUM 10000 [USP'U]/100ML
12 INJECTION, SOLUTION INTRAVENOUS CONTINUOUS
Status: DISCONTINUED | OUTPATIENT
Start: 2019-12-10 | End: 2019-12-10 | Stop reason: HOSPADM

## 2019-12-10 RX ORDER — 0.9 % SODIUM CHLORIDE 0.9 %
1000 INTRAVENOUS SOLUTION INTRAVENOUS ONCE
Status: COMPLETED | OUTPATIENT
Start: 2019-12-10 | End: 2019-12-10

## 2019-12-10 RX ADMIN — MULTIPLE VITAMINS W/ MINERALS TAB 1 TABLET: TAB at 08:54

## 2019-12-10 RX ADMIN — SUCRALFATE 1 G: 1 TABLET ORAL at 08:54

## 2019-12-10 RX ADMIN — CLOPIDOGREL BISULFATE 75 MG: 75 TABLET ORAL at 08:53

## 2019-12-10 RX ADMIN — HEPARIN SODIUM 4000 UNITS: 5000 INJECTION INTRAVENOUS; SUBCUTANEOUS at 11:14

## 2019-12-10 RX ADMIN — ASPIRIN 81 MG: 81 TABLET, COATED ORAL at 08:54

## 2019-12-10 RX ADMIN — BARIUM SULFATE 50 ML: 400 SUSPENSION ORAL at 13:55

## 2019-12-10 RX ADMIN — HEPARIN SODIUM 5000 UNITS: 5000 INJECTION INTRAVENOUS; SUBCUTANEOUS at 05:53

## 2019-12-10 RX ADMIN — SODIUM CHLORIDE: 9 INJECTION, SOLUTION INTRAVENOUS at 02:09

## 2019-12-10 RX ADMIN — PANTOPRAZOLE SODIUM 40 MG: 40 TABLET, DELAYED RELEASE ORAL at 05:55

## 2019-12-10 RX ADMIN — BARIUM SULFATE 80 G: 0.81 POWDER, FOR SUSPENSION ORAL at 13:56

## 2019-12-10 RX ADMIN — SODIUM CHLORIDE 1000 ML: 9 INJECTION, SOLUTION INTRAVENOUS at 12:15

## 2019-12-10 RX ADMIN — IOPAMIDOL 100 ML: 755 INJECTION, SOLUTION INTRAVENOUS at 11:50

## 2019-12-10 RX ADMIN — Medication 400 MG: at 08:53

## 2019-12-10 RX ADMIN — SUCRALFATE 1 G: 1 TABLET ORAL at 18:03

## 2019-12-10 RX ADMIN — HEPARIN SODIUM AND DEXTROSE 12 UNITS/KG/HR: 10000; 5 INJECTION INTRAVENOUS at 11:08

## 2019-12-10 ASSESSMENT — PAIN SCALES - GENERAL: PAINLEVEL_OUTOF10: 0

## 2019-12-11 LAB
ORGANISM: ABNORMAL
URINE CULTURE, ROUTINE: ABNORMAL

## 2020-01-01 ENCOUNTER — CLINICAL DOCUMENTATION (OUTPATIENT)
Dept: PHYSICAL THERAPY | Age: 80
End: 2020-01-01

## 2020-01-01 ENCOUNTER — OFFICE VISIT (OUTPATIENT)
Dept: CARDIOLOGY CLINIC | Age: 80
End: 2020-01-01
Payer: MEDICARE

## 2020-01-01 ENCOUNTER — HOSPITAL ENCOUNTER (OUTPATIENT)
Dept: CT IMAGING | Age: 80
Discharge: HOME | End: 2020-08-26
Payer: MEDICARE

## 2020-01-01 ENCOUNTER — APPOINTMENT (OUTPATIENT)
Dept: CT IMAGING | Age: 80
End: 2020-01-01
Payer: MEDICARE

## 2020-01-01 ENCOUNTER — TELEPHONE (OUTPATIENT)
Dept: CARDIOLOGY CLINIC | Age: 80
End: 2020-01-01

## 2020-01-01 VITALS
RESPIRATION RATE: 18 BRPM | OXYGEN SATURATION: 92 % | WEIGHT: 107 LBS | HEART RATE: 86 BPM | BODY MASS INDEX: 18.37 KG/M2 | SYSTOLIC BLOOD PRESSURE: 118 MMHG | DIASTOLIC BLOOD PRESSURE: 60 MMHG

## 2020-01-01 VITALS
DIASTOLIC BLOOD PRESSURE: 85 MMHG | SYSTOLIC BLOOD PRESSURE: 122 MMHG | BODY MASS INDEX: 17.93 KG/M2 | HEART RATE: 78 BPM | RESPIRATION RATE: 18 BRPM | WEIGHT: 105 LBS | HEIGHT: 64 IN | OXYGEN SATURATION: 94 %

## 2020-01-01 VITALS
BODY MASS INDEX: 20.83 KG/M2 | RESPIRATION RATE: 18 BRPM | OXYGEN SATURATION: 97 % | HEIGHT: 64 IN | HEART RATE: 81 BPM | SYSTOLIC BLOOD PRESSURE: 147 MMHG | DIASTOLIC BLOOD PRESSURE: 74 MMHG | WEIGHT: 122 LBS

## 2020-01-01 PROCEDURE — 1123F ACP DISCUSS/DSCN MKR DOCD: CPT | Performed by: INTERNAL MEDICINE

## 2020-01-01 PROCEDURE — 99214 OFFICE O/P EST MOD 30 MIN: CPT | Performed by: INTERNAL MEDICINE

## 2020-01-01 PROCEDURE — 4040F PNEUMOC VAC/ADMIN/RCVD: CPT | Performed by: INTERNAL MEDICINE

## 2020-01-01 PROCEDURE — G8484 FLU IMMUNIZE NO ADMIN: HCPCS | Performed by: INTERNAL MEDICINE

## 2020-01-01 PROCEDURE — G8427 DOCREV CUR MEDS BY ELIG CLIN: HCPCS | Performed by: INTERNAL MEDICINE

## 2020-01-01 PROCEDURE — G8420 CALC BMI NORM PARAMETERS: HCPCS | Performed by: INTERNAL MEDICINE

## 2020-01-01 PROCEDURE — 1036F TOBACCO NON-USER: CPT | Performed by: INTERNAL MEDICINE

## 2020-01-01 PROCEDURE — G8419 CALC BMI OUT NRM PARAM NOF/U: HCPCS | Performed by: INTERNAL MEDICINE

## 2020-01-01 PROCEDURE — 74150 CT ABDOMEN W/O CONTRAST: CPT

## 2020-01-01 PROCEDURE — 71250 CT THORAX DX C-: CPT

## 2020-01-01 PROCEDURE — 2500000003 HC RX 250 WO HCPCS: Performed by: GENERAL PRACTICE

## 2020-01-01 RX ORDER — LOVASTATIN 20 MG/1
TABLET ORAL
Qty: 90 TABLET | Refills: 2 | OUTPATIENT
Start: 2020-01-01

## 2020-01-01 RX ORDER — CLOPIDOGREL BISULFATE 75 MG/1
TABLET ORAL
Qty: 90 TABLET | Refills: 3 | Status: SHIPPED | OUTPATIENT
Start: 2020-01-01

## 2020-01-01 RX ORDER — OMEPRAZOLE 20 MG/1
CAPSULE, DELAYED RELEASE ORAL
Qty: 90 CAPSULE | Refills: 3 | Status: SHIPPED | OUTPATIENT
Start: 2020-01-01

## 2020-01-01 RX ORDER — FUROSEMIDE 20 MG/1
20 TABLET ORAL DAILY
COMMUNITY

## 2020-01-01 RX ORDER — SPIRONOLACTONE 25 MG/1
25 TABLET ORAL DAILY
COMMUNITY

## 2020-01-01 RX ORDER — ATORVASTATIN CALCIUM 40 MG/1
40 TABLET, FILM COATED ORAL DAILY
Qty: 90 TABLET | Refills: 2 | Status: SHIPPED | OUTPATIENT
Start: 2020-01-01

## 2020-01-01 RX ADMIN — BARIUM SULFATE 450 ML: 20 SUSPENSION ORAL at 15:49

## 2020-01-01 ASSESSMENT — ENCOUNTER SYMPTOMS
BLOOD IN STOOL: 0
COUGH: 1
COUGH: 1
NAUSEA: 0
COUGH: 1
GASTROINTESTINAL NEGATIVE: 1
WHEEZING: 0
NAUSEA: 0
WHEEZING: 0
CHEST TIGHTNESS: 0
CHEST TIGHTNESS: 0
EYES NEGATIVE: 1
SHORTNESS OF BREATH: 0
EYES NEGATIVE: 1
GASTROINTESTINAL NEGATIVE: 1
WHEEZING: 0
BLOOD IN STOOL: 0
EYES NEGATIVE: 1
NAUSEA: 0
STRIDOR: 0
SHORTNESS OF BREATH: 0
STRIDOR: 0
CHEST TIGHTNESS: 0
SHORTNESS OF BREATH: 0
GASTROINTESTINAL NEGATIVE: 1
BLOOD IN STOOL: 0
STRIDOR: 0

## 2020-01-14 ENCOUNTER — HOSPITAL ENCOUNTER (OUTPATIENT)
Dept: PHYSICAL THERAPY | Age: 80
Setting detail: THERAPIES SERIES
Discharge: HOME | End: 2020-01-14
Payer: MEDICARE

## 2020-01-14 ENCOUNTER — HOSPITAL ENCOUNTER (OUTPATIENT)
Dept: SPEECH THERAPY | Age: 80
Setting detail: THERAPIES SERIES
Discharge: HOME | End: 2020-01-14
Payer: MEDICARE

## 2020-01-14 PROCEDURE — 97162 PT EVAL MOD COMPLEX 30 MIN: CPT

## 2020-01-14 PROCEDURE — 92523 SPEECH SOUND LANG COMPREHEN: CPT

## 2020-01-14 NOTE — PROGRESS NOTES
[x][facility]        [][facility]     Outpatient Pediatric Rehab Dept      Outpatient Pediatric Rehab Dept     [facility] [place] [address], [address]        [address] [facility], [address]     Phone: [phone]                   Phone: [phone]     Fax:  [phone]        Fax: [phone]      [facility] Outpatient  Speech Language Pathology  Speech Language/Cognitive Evaluation        NAME:Adán Chaney  : 1940 ([de-identified] y.o.)   MRN: [id number]  PATIENT DIAGNOSIS(ES): imparments in attention  impairments orientation  problems solving  memory  comprehension   verbal expression  L MCA CVA  No chief complaint on file. Speech Therapy Dx.: Receptive/expressive aphasia (R47.01), decreased cognition(R41.89).   Patient Active Problem List    Diagnosis Date Noted    Stroke-like episode ([facility] [address] 2019    Chest pain 11/15/2019    Hematuria 2019    Gross hematuria 2019    Essential hypertension 2018    Ischemic cardiomyopathy 2018    Bilateral carotid bruits 2018    PRADO (dyspnea on exertion) 2018    S/P PTCA (percutaneous transluminal coronary angioplasty) 2018    Hyperlipidemia     CAD (coronary artery disease)     CHF with cardiomyopathy ([address]     Elevated prostate specific antigen (PSA) 2014    Hypertension 2010     Past Medical History:   Diagnosis Date    Arthritis     Hip and back    Asthma     CAD (coronary artery disease)     CHF with cardiomyopathy ([address]     GERD (gastroesophageal reflux disease)     History of hemoptysis     Hyperlipidemia     Hypertension 2010    Interstitial pneumonitis (HCC)     Sinus trouble      Past Surgical History:   Procedure Laterality Date    BRONCHOSCOPY  3/30/2010    COLONOSCOPY      CORONARY ANGIOPLASTY WITH STENT PLACEMENT Left 2010    with percutaneous coronary intervention of the Solving/Reasoning   Sequencing:   Not Tested      Problem solving:    Not Tested    Mathematics:   Simple arithmetic:   Not Tested  Function based:   Not Tested    CLINICAL OBSERVATIONS NOTED DURING THE EVALUATION  Latent Responses  Paraphasic Errors  Cueing Required    Long Term Goals:   Patient to achieve receptive/expressive/ cognitive skills to maximum potential for ADL's. Short Term Goals:  Pt to be seen 1x/wk for 6-10weeks. Receptive/Expresssive Language Short Term Goals  1. Pt will follow 1 step directions given orally from~25-70% accuracy% accuracy with min cues to increase the pt's ability to follow directions provided by caregivers for safe follow through with ADLs. 2.Pt will identify objects/pictures within a field of 2-4 with 80% accuracy with min cues in order to increase his/her understanding of objects in his/her environment for safer and more independent completion of ADLs. 3.Pt will answer biographical/simple y/n questions to 70%. 4.Pt will produce automatic speech tasks with initial cue from ~60 to 90%. 5.Pt will complete low level naming(object, picture from 50%-80%. 6.Pt will repeat 3 word phrases to 80%. 7.Patient to complete phrases/sentences with visual pictorial  cues as needed to 80%. Prognosis for improvements:  Good family support/communication and motivation    Pain:  Patient denies pain. Patient stated goals:   Patient could not state. Education:  Patient Kimmie Cope educated regarding results of evaluation. Family indicated understanding. Patient needs reinforcement. VERA NOMS: Initial  Expressive Language:  Current:CL  Expected:CK  Form in chart.   MODIFIED COLVIN FALL RISK ASSESSMENT:    History of Falling (has patient fallen in the past 30 days?):    No  Secondary Diagnosis (is there more than 1 medical diagnosis in patients medical history?):    Yes  Ambulatory Aid:    No device is used (0 points)  Gait:    Weak - patient stops but lifts head and maintains balance, may require light touch of furniture (10 points)  Mental Status:    Oriented to own ability (0 points)  Total points =15    Fall Risk Level: low  0 - 24: Low Risk - implement low risk fall prevention interventions    25 - 44: Medium risk  45 and higher: High Risk        Therapy Time  Time in:1300  Time out:1400  Minutes seen:  60    Signature: Electronically signed by PEG Watt on 1/16/2020 at 9:27 AM    Dear Maurice Kim:     The following patient has been evaluated for speech therapy services and for therapy to continue, insurance requires physician review of the treatment plan initially and every 30 days. Please review the attached evaluation and/or summary of the patient's plan of care, and verify that you agree therapy should continue by signing the attached document and sending it back to our office. If you have any questions or concerns, please don't hesitate to call.   Thank you for your referral.        Physician Signature:________________________________Date:__________________  By signing above, therapists plan is approved by physician

## 2020-01-14 NOTE — PROGRESS NOTES
[address]  PHYSICAL THERAPY EVALUATION    Date: 2020  Patient Name: Arslan Latif       MRN: [id number]   Account: [de-identified]   : 1940  ([de-identified] y.o.)   Gender: male   Referring Practitioner: Dr. Lulú Vigil                 Diagnosis: Impairments in mobility, transfers, LE strength, endurance and balance secondary to Lt MCA CVA  Treatment Diagnosis: Abnormality of gait  Additional Pertinent Hx: CAD with stent, HTN, OA, CHF          Past Medical History:  has a past medical history of Arthritis, Asthma, CAD (coronary artery disease), CHF with cardiomyopathy ([facility] [address] GERD (gastroesophageal reflux disease), History of hemoptysis, Hyperlipidemia, Hypertension, Interstitial pneumonitis ([facility] [address] and Sinus trouble. Past Surgical History:   has a past surgical history that includes Tonsillectomy; Colonoscopy; Coronary angioplasty with stent (Left, 2010); Upper gastrointestinal endoscopy; and bronchoscopy (3/30/2010). Vital Signs  Patient Currently in Pain: No   Pain Screening  Patient Currently in Pain: No     Lives With: Family  Type of Home: House  Home Layout: Two level  Home Access: Stairs to enter with rails  Entrance Stairs - Number of Steps: 3  ADL Assistance: Independent  Homemaking Assistance: Needs assistance  Ambulation Assistance: Independent  Transfer Assistance: Independent  Active : No  Occupation: Retired        Subjective:  Subjective: Had a CVA sometime between nd 19. Son found him and pt had slurred speech and went to ER. Was eventually transported to [facility] [place], operated by [facility] due to blood clotting problems. Was eventually transferred to inpatient rehab at [facility] then D/C'd home 20. Practicing stairs and standing balance at home. Moves to Lt a lot when walking. Gilberto Smith is still a problem.        Objective:   Sensation  Overall Sensation Status: WNL    Balance  Comments: Riley = 47/56, DGI = 18/24    Ambulation 1  Surface: carpet  Device: No Device  Assistance: Supervision, Independent  Quality of Gait: Often looks down at ground  Gait Deviations: Decreased arm swing, Decreased head and trunk rotation  Distance: 80'  Stairs  # Steps : 4  Stairs Height: 6\"  Rails: Bilateral  Device: No Device  Assistance: Supervision, Independent  Comment: recip     Transfers  Sit to Stand: Independent  Stand to sit: Independent    Strength RLE  Strength RLE: WFL  Comment: Except hip abd 4-/5, expect hip ext weakness  Strength LLE  Strength LLE: WFL  Comment: Except hip abd 4-/5, expect hip ext weakness      Bed mobility  Rolling to Left: Independent  Rolling to Right: Independent  Supine to Sit: Independent  Sit to Supine: Independent    Exercises:   Exercises  Exercise 1: Single stepping*  Exercise 2: Foam*  Exercise 3: Gait drills*  Exercise 4: DGI tasks*  Exercise 5: 3 way SLR*  Exercise 6: Bridges*  Exercise 7: Dual tasking - may have difficulty with cognitive tasks*  Exercise 20: HEP: amb with head turns, amb with changing speed  *Indicates exercise,modality, or manual techniques to be initiated when appropriate    Assessment: Body structures, Functions, Activity limitations: Decreased functional mobility , Decreased strength, Decreased balance, Decreased endurance  Assessment: Pt presents with a decline in overall mobility s/p recent CVA. Pt demonstrates decreased maria hip strength possibly contributing to instability at times with ambulation. Pt with good static standing balance but is at an increased risk for falls per DGI scores. Pt is able to ambulate with a preference to look down at ground with decreased arm swing and trunk rotation. Pt would benefit from further skilled PT to improve his strength, balance and safety with all mobility.   Prognosis: Good  Discharge Recommendations: Continue to assess pending progress  Activity Tolerance: Patient Tolerated treatment well     Decision Making: Medium Complexity  History: PMH: CAD with stent, HTN, OA, CHF  Exam: Decreased strength and balance impacting mobility and safety. Clinical Presentation: Evolving        Plan  Frequency/Duration:  Plan  Times per week: 1  Plan weeks: 4-5  Current Treatment Recommendations: Strengthening, Balance Training, Functional Mobility Training, Transfer Training, Gait Training, Stair training, Neuromuscular Re-education, Manual Therapy - Soft Tissue Mobilization, Home Exercise Program, Safety Education & Training, Patient/Caregiver Education & Training, Equipment Evaluation, Education, & procurement, Modalities       Patient Education  New Education Provided: PT Education: Goals;PT Role;Plan of Care;Home Exercise Program    POST-PAIN     Pain Rating (0-10 pain scale): 0  /10  Location and pain description same as pre-treatment unless indicated. Action: [x] NA  [] Call Physician  [] Perform HEP  [] Meds as prescribed    Evaluation and patient rights have been reviewed and patient agrees with plan of care. Yes  [x]  No  []   Explain:       Kim Fall Risk Assessment  Risk Factor Scale  Score   History of Falls [x] Yes  [] No 25  0 25   Secondary Diagnosis [] Yes  [x] No 15  0 0   Ambulatory Aid [] Furniture  [] Crutches/cane/walker  [x] None/bedrest/wheelchair/nurse 30  15  0 0   IV/Heparin Lock [] Yes  [x] No 20  0 0   Gait/Transferring [] Impaired  [x] Weak  [] Normal/bedrest/immobile 20  10  0 10   Mental Status [] Forgets limitations  [x] Oriented to own ability 15  0 0      Total:35     Based on the Assessment score: check the appropriate box.   []  No intervention needed   Low =   Score of 0-24  [x]  Use standard prevention interventions Moderate =  Score of 24-44   [x] Discuss fall prevention strategies   [x] Indicate moderate falls risk on eval  []  Use high risk prevention interventions High = Score of 45 and higher   [] Discuss fall prevention strategies   [] Provide supervision during treatment time    Goals  Long term goals  Time Frame for Long term goals : 4-5 weeks  Long term goal 1: Improve maria LE strength by >/= 1/2 MMT grade to improve stability with ambulation. Long term goal 2: Pt will be able to ambulate >/= 250' on even and uneven surfaces with minimal to no deviations. Long term goal 3: DGI >/= 21/24 to reduce pt's risk for falls. Long term goal 4: 6MWT >/= 1000' to improve tolerance for ambulation.          PT Individual Minutes  Time In: 6702  Time Out: 1522  Minutes: 35     Procedure Minutes:35' eval       Electronically signed by Olegario Cabrera PT on 1/14/20 at 3:37 PM

## 2020-01-14 NOTE — PROGRESS NOTES
[address]     Ph: [phone]  Fax: [phone]    [x] Certification  [] Recertification []  Plan of Care  [] Progress Note [] Discharge      To:  Dr. Beny Moreua      From:  Coty Rodriguez, PT  Patient: Corey Renner     : 1940  Diagnosis: Impairments in mobility, transfers, LE strength, endurance and balance secondary to Lt MCA CVA     Date: 2020  Treatment Diagnosis: Abnormality of gait    Plan of Care/Certification Expiration Date: 20  Progress Report Period from:  2020  to 2020    Total # of Visits to Date: 1   No Show: 0    Canceled Appointment: 0     OBJECTIVE:  Long Term Goals - Time Frame for Long term goals : 4-5 weeks  Goals Current/ Discharge status Met   Long term goal 1: Improve maria LE strength by >/= 1/2 MMT grade to improve stability with ambulation. Strength RLE  Strength RLE: WFL  Comment: Except hip abd 4-/5, expect hip ext weakness  Strength LLE  Strength LLE: WFL  Comment: Except hip abd 4-/5, expect hip ext weakness [] yes  [] no   Long term goal 2: Pt will be able to ambulate >/= 250' on even and uneven surfaces with minimal to no deviations. Ambulation 1  Surface: carpet  Device: No Device  Assistance: Supervision, Independent  Quality of Gait: Often looks down at ground  Gait Deviations: Decreased arm swing, Decreased head and trunk rotation  Distance: [de-identified]' [] yes  [] no   Long term goal 3: DGI >/= 21/24 to reduce pt's risk for falls. Dynamic Gait Total Score: 18 [] yes  [] no   Long term goal 4: 6MWT >/= 1000' to improve tolerance for ambulation. NT to due fatigue [] yes  [] no       Body structures, Functions, Activity limitations: Decreased functional mobility , Decreased strength, Decreased balance, Decreased endurance  Assessment: Pt presents with a decline in overall mobility s/p recent CVA.   Pt demonstrates decreased maria hip strength possibly

## 2020-01-16 ENCOUNTER — HOSPITAL ENCOUNTER (OUTPATIENT)
Dept: OCCUPATIONAL THERAPY | Age: 80
Setting detail: THERAPIES SERIES
End: 2020-01-16
Payer: MEDICARE

## 2020-01-29 ENCOUNTER — OFFICE VISIT (OUTPATIENT)
Dept: CARDIOLOGY CLINIC | Age: 80
End: 2020-01-29
Payer: MEDICARE

## 2020-01-29 VITALS
HEART RATE: 73 BPM | RESPIRATION RATE: 18 BRPM | SYSTOLIC BLOOD PRESSURE: 110 MMHG | WEIGHT: 135.2 LBS | BODY MASS INDEX: 23.21 KG/M2 | OXYGEN SATURATION: 98 % | DIASTOLIC BLOOD PRESSURE: 64 MMHG

## 2020-01-29 PROBLEM — E78.00 PURE HYPERCHOLESTEROLEMIA: Status: ACTIVE | Noted: 2020-01-29

## 2020-01-29 PROBLEM — R09.89 DIMINISHED PULSES IN LOWER EXTREMITY: Status: ACTIVE | Noted: 2020-01-29

## 2020-01-29 PROCEDURE — G8484 FLU IMMUNIZE NO ADMIN: HCPCS | Performed by: INTERNAL MEDICINE

## 2020-01-29 PROCEDURE — G8427 DOCREV CUR MEDS BY ELIG CLIN: HCPCS | Performed by: INTERNAL MEDICINE

## 2020-01-29 PROCEDURE — 1123F ACP DISCUSS/DSCN MKR DOCD: CPT | Performed by: INTERNAL MEDICINE

## 2020-01-29 PROCEDURE — 1036F TOBACCO NON-USER: CPT | Performed by: INTERNAL MEDICINE

## 2020-01-29 PROCEDURE — 4040F PNEUMOC VAC/ADMIN/RCVD: CPT | Performed by: INTERNAL MEDICINE

## 2020-01-29 PROCEDURE — 99214 OFFICE O/P EST MOD 30 MIN: CPT | Performed by: INTERNAL MEDICINE

## 2020-01-29 PROCEDURE — G8420 CALC BMI NORM PARAMETERS: HCPCS | Performed by: INTERNAL MEDICINE

## 2020-01-29 RX ORDER — ATORVASTATIN CALCIUM 40 MG/1
40 TABLET, FILM COATED ORAL DAILY
COMMUNITY
End: 2020-01-01 | Stop reason: SDUPTHER

## 2020-01-29 RX ORDER — CARVEDILOL 6.25 MG/1
6.25 TABLET ORAL 2 TIMES DAILY WITH MEALS
COMMUNITY

## 2020-01-29 RX ORDER — TAMSULOSIN HYDROCHLORIDE 0.4 MG/1
1 CAPSULE ORAL DAILY
COMMUNITY
Start: 2020-01-27

## 2020-01-29 ASSESSMENT — ENCOUNTER SYMPTOMS
EYES NEGATIVE: 1
RESPIRATORY NEGATIVE: 1
COUGH: 0
NAUSEA: 0
CHEST TIGHTNESS: 0
BLOOD IN STOOL: 0
STRIDOR: 0
GASTROINTESTINAL NEGATIVE: 1
SHORTNESS OF BREATH: 0
WHEEZING: 0

## 2020-01-29 NOTE — PROGRESS NOTES
Subsequent Progress Note  Patient: Arslan Latif  YOB: 1940  MRN: [id number]    Chief Complaint: sob hematuria cmp CVA   Chief Complaint   Patient presents with    Follow-up     2 month    Congestive Heart Failure    Coronary Artery Disease    Hypertension    Follow-Up from [facility] 12/18-1/7/20-stroke       CV Data:  2010 CAD stent  5/2018 spect negative  5/2018 echo EF 35%  5/2018 CUS <  6/2018 MUGA 45%  9/2019 Mild to moderate  12/2010 Echo EF 60    Subjective/HPI: couple weeks noted blood in urine. No cp no osob nof alls takes meds     11/15/2019 now experience PRADO. occ cp. Takes meds. No more Hematuria. 1/29/2020 recent CVA at [facility]. Slurred speech resolved. Has garnica in since [facility]. Little Hematuria. Garnica will come out this Sunday and will f/u w Urology Monday. Still has PRADO. No falls. Eats well.      nonsmoker  EKG:    Past Medical History:   Diagnosis Date    Arthritis     Hip and back    Asthma     CAD (coronary artery disease)     CHF with cardiomyopathy ([facility] [address]     GERD (gastroesophageal reflux disease)     History of hemoptysis     Hyperlipidemia     Hypertension 03/19/2010    Interstitial pneumonitis (HCC)     Sinus trouble        Past Surgical History:   Procedure Laterality Date    BRONCHOSCOPY  3/30/2010    COLONOSCOPY      CORONARY ANGIOPLASTY WITH STENT PLACEMENT Left 4/2/2010    with percutaneous coronary intervention of the right coronary artery    TONSILLECTOMY      UPPER GASTROINTESTINAL ENDOSCOPY         Family History   Family history unknown: Yes       Social History     Socioeconomic History    Marital status:       Spouse name: None    Number of children: None    Years of education: None    Highest education level: None   Occupational History    Occupation: Retired     Employer: [employer]   Social Needs    Financial resource strain: None    Food insecurity:     Worry: None     Inability: None    Transportation needs: Medical: None     Non-medical: None   Tobacco Use    Smoking status: Former Smoker     Packs/day: 1.00     Years: 6.00     Pack years: 6.00     Types: Cigarettes     Last attempt to quit: 1963     Years since quittin.4    Smokeless tobacco: Never Used   Substance and Sexual Activity    Alcohol use: No     Alcohol/week: 0.0 standard drinks    Drug use: No    Sexual activity: Never   Lifestyle    Physical activity:     Days per week: None     Minutes per session: None    Stress: None   Relationships    Social connections:     Talks on phone: None     Gets together: None     Attends Zoroastrianism service: None     Active member of club or organization: None     Attends meetings of clubs or organizations: None     Relationship status: None    Intimate partner violence:     Fear of current or ex partner: None     Emotionally abused: None     Physically abused: None     Forced sexual activity: None   Other Topics Concern    None   Social History Narrative    None       Allergies   Allergen Reactions    Pcn [Penicillins]        Current Outpatient Medications   Medication Sig Dispense Refill    aspirin 81 MG tablet Take 81 mg by mouth daily      atorvastatin (LIPITOR) 40 MG tablet Take 40 mg by mouth daily      carvedilol (COREG) 6.25 MG tablet Take 6.25 mg by mouth 2 times daily (with meals)      ERGOCALCIFEROL PO Take by mouth 8000 unit/ML soln      omeprazole (PRILOSEC) 20 MG delayed release capsule TAKE ONE CAPSULE BY MOUTH EVERY DAY 90 capsule 3    clopidogrel (PLAVIX) 75 MG tablet TAKE 1 TABLET BY MOUTH EVERY DAY 90 tablet 3    tamsulosin (FLOMAX) 0.4 MG capsule Take 1 capsule by mouth daily       No current facility-administered medications for this visit. Review of Systems:   Review of Systems   Constitutional: Negative. Negative for diaphoresis and fatigue. HENT: Negative. Eyes: Negative. Respiratory: Negative.   Negative for cough, chest tightness, shortness of breath, Component Value Date    HDL 48 12/10/2019    HDL 46 10/05/2018    HDL 61 (H) 05/16/2013     Lab Results   Component Value Date    LDLCALC 61 12/10/2019    LDLCALC 101 10/05/2018    LDLCALC 94 05/16/2013     No results found for: LABVLDL, VLDL  No results found for: CHOLHDLRATIO  CMP:    Lab Results   Component Value Date     12/10/2019    K 4.1 12/10/2019     12/10/2019    CO2 20 12/10/2019    BUN 24 12/10/2019    CREATININE 1.28 12/10/2019    GFRAA >60.0 12/10/2019    LABGLOM 54.0 12/10/2019    GLUCOSE 84 12/10/2019    PROT 9.3 12/09/2019    LABALBU 4.0 12/09/2019    CALCIUM 8.1 12/10/2019    BILITOT 0.7 12/09/2019    ALKPHOS 117 12/09/2019    AST 37 12/09/2019    ALT 23 12/09/2019     BMP:    Lab Results   Component Value Date     12/10/2019    K 4.1 12/10/2019     12/10/2019    CO2 20 12/10/2019    BUN 24 12/10/2019    LABALBU 4.0 12/09/2019    CREATININE 1.28 12/10/2019    CALCIUM 8.1 12/10/2019    GFRAA >60.0 12/10/2019    LABGLOM 54.0 12/10/2019    GLUCOSE 84 12/10/2019     Magnesium:    Lab Results   Component Value Date    MG 2.4 12/09/2019     TSH:  Lab Results   Component Value Date    TSH 0.927 12/09/2019       Patient Active Problem List   Diagnosis    Elevated prostate specific antigen (PSA)    Hypertension    Hyperlipidemia    CAD (coronary artery disease)    [facility]F with cardiomyopathy ([facility] [address]    Bilateral carotid bruits    PRADO (dyspnea on exertion)    S/P PTCA (percutaneous transluminal coronary angioplasty)    Essential hypertension    Ischemic cardiomyopathy    Gross hematuria    Hematuria    Chest pain    Stroke-like episode (HCC)    Pure hypercholesterolemia    Diminished pulses in lower extremity       Medications Discontinued During This Encounter   Medication Reason    spironolactone-hydrochlorothiazide (ALDACTAZIDE) 25-25 MG per tablet DISCONTINUED BY ANOTHER CLINICIAN    benzonatate (TESSALON) 200 MG capsule DISCONTINUED BY ANOTHER CLINICIAN    enalapril (VASOTEC) 20 MG tablet DISCONTINUED BY ANOTHER CLINICIAN    etodolac (LODINE) 400 MG tablet DISCONTINUED BY ANOTHER CLINICIAN    lovastatin (MEVACOR) 20 MG tablet DISCONTINUED BY ANOTHER CLINICIAN    magnesium oxide (MAG-OX) 400 (240 Mg) MG tablet DISCONTINUED BY ANOTHER CLINICIAN    amLODIPine (NORVASC) 5 MG tablet DISCONTINUED BY ANOTHER CLINICIAN    potassium chloride (KLOR-CON) 8 MEQ extended release tablet DISCONTINUED BY ANOTHER CLINICIAN    sucralfate (CARAFATE) 1 GM tablet DISCONTINUED BY ANOTHER CLINICIAN    Multiple Vitamins-Minerals (THERAPEUTIC MULTIVITAMIN-MINERALS) tablet DISCONTINUED BY ANOTHER CLINICIAN    carvedilol (COREG) 25 MG tablet DOSE ADJUSTMENT       Modified Medications    No medications on file       No orders of the defined types were placed in this encounter. Assessment/Plan:    1. Essential hypertension   stable     2. Pure hypercholesterolemia   statin     3. Coronary artery disease involving native coronary artery of native heart without angina pectoris   no angina - remains on ASA and Plavix. 4. CHF with cardiomyopathy ([address]     5. Hematuria- f/u Urology    6. PRADO- he still needs spect. Counseling:  Heart Healthy Lifestyle, Low Salt Diet, Take Precautions to Prevent Falls and Walk Daily    Return in about 3 months (around 4/29/2020) for Cardiovascular care. .      Electronically signed by Saran Britt MD on 1/29/2020 at 2:08 PM

## 2020-02-05 ENCOUNTER — HOSPITAL ENCOUNTER (OUTPATIENT)
Dept: OCCUPATIONAL THERAPY | Age: 80
Setting detail: THERAPIES SERIES
Discharge: HOME | End: 2020-02-05
Payer: MEDICARE

## 2020-02-05 PROCEDURE — 97165 OT EVAL LOW COMPLEX 30 MIN: CPT

## 2020-02-05 NOTE — PROGRESS NOTES
Occupational Therapy  The following patient has been evaluated for occupational therapy services and currently will not require them, Medicare requires physician review of the treatment plan. Please review the attached evaluation and/or summary of the patient's plan of care, and verify that you agree therapy should continue by signing the attached document and sending it back to our office. Thank you for this referral.    Physician signature_______________________ Date________________              OCCUPATIONAL THERAPY EVALUATION  Evaluation Date:  2/5/2020    Patient Name: Mey Kasper  YOB: 1940   Medical Diagnosis:  L MCA CVA  Treatment Diagnosis: RUE dysfunction following CVA  Referring Physician: Ralph Smith MD  Referral Date: 1-   Onset Date:  December 2019    Visits Allowed/Insurance/Certification Information- Medicare Product    Restrictions/Precautions Non , No restrictions    SUBJECTIVE FINDINGS  Pain    Patient reports 0 /10 at rest,0  /10 with movement. Patient goal for therapy: Per Pt \" I can do everything for myself\" this was confirmed by son. Pt lives with son. History of Present Illness: Pt had CVA  And went to [facility] for medical management  Current Functional Limitations: Son reports primary limitations are speech and cognition    Prior Level of Functioning: Independent  Pt previous lived in his own home independently  OBSERVATION - Pt signed all of his own paper work  Using dominant hand. Pt signed in clear cursive , required cuing to complete just initals. Pt walked in without device with no loss of balance. Pt does appear to have some difficulty following directions initially, but with demonstration Pt could complete all requested activities. Pt will be seen by ST for outpatient    ADL'S  Pt is independent.  Supervision for in and out of tub /shower  Per son\"I just  the doorway till he gets in\"         OBJECTIVE FINDINGS  Range of

## 2020-02-12 ENCOUNTER — HOSPITAL ENCOUNTER (OUTPATIENT)
Dept: NUCLEAR MEDICINE | Age: 80
Discharge: HOME | End: 2020-02-14
Payer: MEDICARE

## 2020-02-12 PROCEDURE — 6360000002 HC RX W HCPCS: Performed by: INTERNAL MEDICINE

## 2020-02-12 PROCEDURE — A9502 TC99M TETROFOSMIN: HCPCS | Performed by: INTERNAL MEDICINE

## 2020-02-12 PROCEDURE — 93017 CV STRESS TEST TRACING ONLY: CPT

## 2020-02-12 PROCEDURE — 2580000003 HC RX 258: Performed by: INTERNAL MEDICINE

## 2020-02-12 PROCEDURE — 3430000000 HC RX DIAGNOSTIC RADIOPHARMACEUTICAL: Performed by: INTERNAL MEDICINE

## 2020-02-12 PROCEDURE — 78452 HT MUSCLE IMAGE SPECT MULT: CPT

## 2020-02-12 RX ORDER — SODIUM CHLORIDE 0.9 % (FLUSH) 0.9 %
10 SYRINGE (ML) INJECTION PRN
Status: COMPLETED | OUTPATIENT
Start: 2020-02-12 | End: 2020-02-12

## 2020-02-12 RX ADMIN — Medication 10 ML: at 07:30

## 2020-02-12 RX ADMIN — Medication 10 ML: at 09:22

## 2020-02-12 RX ADMIN — Medication 10 ML: at 09:23

## 2020-02-12 RX ADMIN — TETROFOSMIN 34.5 MILLICURIE: 1.38 INJECTION, POWDER, LYOPHILIZED, FOR SOLUTION INTRAVENOUS at 09:22

## 2020-02-12 RX ADMIN — REGADENOSON 0.4 MG: 0.08 INJECTION, SOLUTION INTRAVENOUS at 09:22

## 2020-02-12 RX ADMIN — TETROFOSMIN 12 MILLICURIE: 1.38 INJECTION, POWDER, LYOPHILIZED, FOR SOLUTION INTRAVENOUS at 07:30

## 2020-02-28 ENCOUNTER — OFFICE VISIT (OUTPATIENT)
Dept: CARDIOLOGY CLINIC | Age: 80
End: 2020-02-28
Payer: MEDICARE

## 2020-02-28 VITALS
HEART RATE: 80 BPM | BODY MASS INDEX: 22.36 KG/M2 | OXYGEN SATURATION: 97 % | WEIGHT: 131 LBS | HEIGHT: 64 IN | RESPIRATION RATE: 18 BRPM | SYSTOLIC BLOOD PRESSURE: 137 MMHG | DIASTOLIC BLOOD PRESSURE: 76 MMHG

## 2020-02-28 PROBLEM — Z71.2 ENCOUNTER TO DISCUSS TEST RESULTS: Status: ACTIVE | Noted: 2020-02-28

## 2020-02-28 PROCEDURE — G8420 CALC BMI NORM PARAMETERS: HCPCS | Performed by: INTERNAL MEDICINE

## 2020-02-28 PROCEDURE — 4040F PNEUMOC VAC/ADMIN/RCVD: CPT | Performed by: INTERNAL MEDICINE

## 2020-02-28 PROCEDURE — 1036F TOBACCO NON-USER: CPT | Performed by: INTERNAL MEDICINE

## 2020-02-28 PROCEDURE — G8484 FLU IMMUNIZE NO ADMIN: HCPCS | Performed by: INTERNAL MEDICINE

## 2020-02-28 PROCEDURE — 1123F ACP DISCUSS/DSCN MKR DOCD: CPT | Performed by: INTERNAL MEDICINE

## 2020-02-28 PROCEDURE — G8427 DOCREV CUR MEDS BY ELIG CLIN: HCPCS | Performed by: INTERNAL MEDICINE

## 2020-02-28 PROCEDURE — 99214 OFFICE O/P EST MOD 30 MIN: CPT | Performed by: INTERNAL MEDICINE

## 2020-02-28 ASSESSMENT — ENCOUNTER SYMPTOMS
BLOOD IN STOOL: 0
GASTROINTESTINAL NEGATIVE: 1
EYES NEGATIVE: 1
NAUSEA: 0
STRIDOR: 0
COUGH: 1
SHORTNESS OF BREATH: 0
CHEST TIGHTNESS: 0
WHEEZING: 0

## 2020-03-17 NOTE — PROGRESS NOTES
Speech Language Pathology  Patient discharged from speech therapy. Evaluation only. He did not return. Several calls  And messages were left with the family.   Electronically signed by PEG Kumar on 3/17/2020 at 12:27 PM

## 2020-03-29 PROBLEM — Z71.2 ENCOUNTER TO DISCUSS TEST RESULTS: Status: RESOLVED | Noted: 2020-02-28 | Resolved: 2020-01-01

## 2020-09-18 NOTE — PROGRESS NOTES
education: None    Highest education level: None   Occupational History    Occupation: Retired     Employer: [employer]   Social Needs    Financial resource strain: None    Food insecurity     Worry: None     Inability: None    Transportation needs     Medical: None     Non-medical: None   Tobacco Use    Smoking status: Former Smoker     Packs/day: 1.00     Years: 6.00     Pack years: 6.00     Types: Cigarettes     Last attempt to quit: 1963     Years since quittin.7    Smokeless tobacco: Never Used   Substance and Sexual Activity    Alcohol use: No     Alcohol/week: 0.0 standard drinks    Drug use: No    Sexual activity: Never   Lifestyle    Physical activity     Days per week: None     Minutes per session: None    Stress: None   Relationships    Social connections     Talks on phone: None     Gets together: None     Attends Nondenominational service: None     Active member of club or organization: None     Attends meetings of clubs or organizations: None     Relationship status: None    Intimate partner violence     Fear of current or ex partner: None     Emotionally abused: None     Physically abused: None     Forced sexual activity: None   Other Topics Concern    None   Social History Narrative    None       Allergies   Allergen Reactions    Pcn [Penicillins]        Current Outpatient Medications   Medication Sig Dispense Refill    furosemide (LASIX) 20 MG tablet Take 20 mg by mouth daily      spironolactone (ALDACTONE) 25 MG tablet Take 25 mg by mouth daily      omeprazole (PRILOSEC) 20 MG delayed release capsule TAKE ONE CAPSULE BY MOUTH EVERY DAY 90 capsule 3    atorvastatin (LIPITOR) 40 MG tablet Take 1 tablet by mouth daily 90 tablet 2    clopidogrel (PLAVIX) 75 MG tablet TAKE 1 TABLET BY MOUTH EVERY DAY 90 tablet 3    aspirin 81 MG tablet Take 81 mg by mouth daily      carvedilol (COREG) 6.25 MG tablet Take 6.25 mg by mouth 2 times daily (with meals)      tamsulosin (FLOMAX) 0.4 MG 14.2 12/10/2019     12/10/2019    MPV 7.7 05/16/2013     Lipids:  Lab Results   Component Value Date    CHOL 130 12/10/2019    CHOL 175 10/05/2018    CHOL 193 05/16/2013     Lab Results   Component Value Date    TRIG 106 12/10/2019    TRIG 138 10/05/2018    TRIG 189 (H) 05/16/2013     Lab Results   Component Value Date    HDL 48 12/10/2019    HDL 46 10/05/2018    HDL 61 (H) 05/16/2013     Lab Results   Component Value Date    LDLCALC 61 12/10/2019    LDLCALC 101 10/05/2018    LDLCALC 94 05/16/2013     No results found for: LABVLDL, VLDL  No results found for: CHOLHDLRATIO  CMP:    Lab Results   Component Value Date     12/10/2019    K 4.1 12/10/2019     12/10/2019    CO2 20 12/10/2019    BUN 24 12/10/2019    CREATININE 1.28 12/10/2019    GFRAA >60.0 12/10/2019    LABGLOM 54.0 12/10/2019    GLUCOSE 84 12/10/2019    PROT 9.3 12/09/2019    LABALBU 4.0 12/09/2019    CALCIUM 8.1 12/10/2019    BILITOT 0.7 12/09/2019    ALKPHOS 117 12/09/2019    AST 37 12/09/2019    ALT 23 12/09/2019     BMP:    Lab Results   Component Value Date     12/10/2019    K 4.1 12/10/2019     12/10/2019    CO2 20 12/10/2019    BUN 24 12/10/2019    LABALBU 4.0 12/09/2019    CREATININE 1.28 12/10/2019    CALCIUM 8.1 12/10/2019    GFRAA >60.0 12/10/2019    LABGLOM 54.0 12/10/2019    GLUCOSE 84 12/10/2019     Magnesium:    Lab Results   Component Value Date    MG 2.4 12/09/2019     TSH:  Lab Results   Component Value Date    TSH 0.927 12/09/2019       Patient Active Problem List   Diagnosis    Elevated prostate specific antigen (PSA)    Hypertension    Hyperlipidemia    CAD (coronary artery disease)    CHF with cardiomyopathy ([address]    Bilateral carotid bruits    PRADO (dyspnea on exertion)    S/P PTCA (percutaneous transluminal coronary angioplasty)    Essential hypertension    Ischemic cardiomyopathy    Gross hematuria    Hematuria    Chest pain    Stroke-like episode (HCC)    Pure hypercholesterolemia    Diminished pulses in lower extremity       Medications Discontinued During This Encounter   Medication Reason    ERGOCALCIFEROL PO LIST CLEANUP       Modified Medications    No medications on file       No orders of the defined types were placed in this encounter. Assessment/Plan:    1. Essential hypertension   stable     2. Pure hypercholesterolemia   statin     3. Coronary artery disease involving native coronary artery of native heart without angina pectoris   no angina - remains on ASA and Plavix. 4. CHF with cardiomyopathy ([address]     5. Hematuria- f/u Urology - stable no further blood. - remains stable     6. PRADO- stable      Counseling:  Heart Healthy Lifestyle, Low Salt Diet, Take Precautions to Prevent Falls and Walk Daily    Return in about 3 months (around 12/18/2020).       Electronically signed by Deon Wallace MD on 9/18/2020 at 2:21 PM

## 2020-12-11 NOTE — PROGRESS NOTES
Subsequent Progress Note  Patient: Jazmine Hoover  YOB: 1940  MRN: [id number]    Chief Complaint: sob hematuria cmp CVA   Chief Complaint   Patient presents with    3 Month Follow-Up    Coronary Artery Disease    Congestive Heart Failure    Hypertension       CV Data:  2010 CAD stent  5/2018 spect negative  5/2018 echo EF 35%  5/2018 CUS <  6/2018 MUGA 45%  9/2019 Mild to moderate  12/2010 Echo EF 60  2/2020 spect apical scar  12/2019 CUS mild     Subjective/HPI: couple weeks noted blood in urine. No cp no osob nof alls takes meds     11/15/2019 now experience PRADO. occ cp. Takes meds. No more Hematuria. 1/29/2020 recent CVA at [facility]. Slurred speech resolved. Has garnica in since [facility]. Little Hematuria. Garnica will come out this Sunday and will f/u w Urology Monday. Still has PRADO. No falls. Eats well. 2/28/2020 no cp no sob no falls no bleed. Takes meds. Eats well. Garnica catheter out now.     6/26/2020 recent admisson for PNA at [facility]. Doing better. No cp breathing is better. 9/18/2020 loosing weight. No cp no so bno falls no bleed eating well. Has frequent cough. No fever.  Recent CT chest no mass    12/11/2020 no cp no sob no falls no more hematuria takes meds eats well     nonsmoker  EKG:    Past Medical History:   Diagnosis Date    Arthritis     Hip and back    Asthma     CAD (coronary artery disease)     CHF with cardiomyopathy ([address]     GERD (gastroesophageal reflux disease)     History of hemoptysis     Hyperlipidemia     Hypertension 03/19/2010    Interstitial pneumonitis (HCC)     Sinus trouble        Past Surgical History:   Procedure Laterality Date    BRONCHOSCOPY  3/30/2010    COLONOSCOPY      CORONARY ANGIOPLASTY WITH STENT PLACEMENT Left 4/2/2010    with percutaneous coronary intervention of the right coronary artery    TONSILLECTOMY      UPPER GASTROINTESTINAL ENDOSCOPY         Family History   Family history unknown: Yes       Social History Socioeconomic History    Marital status:       Spouse name: None    Number of children: None    Years of education: None    Highest education level: None   Occupational History    Occupation: Retired     Employer: [employer]   Social Needs    Financial resource strain: None    Food insecurity     Worry: None     Inability: None    Transportation needs     Medical: None     Non-medical: None   Tobacco Use    Smoking status: Former Smoker     Packs/day: 1.00     Years: 6.00     Pack years: 6.00     Types: Cigarettes     Last attempt to quit: 1963     Years since quittin.9    Smokeless tobacco: Never Used   Substance and Sexual Activity    Alcohol use: No     Alcohol/week: 0.0 standard drinks    Drug use: No    Sexual activity: Never   Lifestyle    Physical activity     Days per week: None     Minutes per session: None    Stress: None   Relationships    Social connections     Talks on phone: None     Gets together: None     Attends Scientologist service: None     Active member of club or organization: None     Attends meetings of clubs or organizations: None     Relationship status: None    Intimate partner violence     Fear of current or ex partner: None     Emotionally abused: None     Physically abused: None     Forced sexual activity: None   Other Topics Concern    None   Social History Narrative    None       Allergies   Allergen Reactions    Pcn [Penicillins]        Current Outpatient Medications   Medication Sig Dispense Refill    furosemide (LASIX) 20 MG tablet Take 20 mg by mouth daily      spironolactone (ALDACTONE) 25 MG tablet Take 25 mg by mouth daily      omeprazole (PRILOSEC) 20 MG delayed release capsule TAKE ONE CAPSULE BY MOUTH EVERY DAY 90 capsule 3    atorvastatin (LIPITOR) 40 MG tablet Take 1 tablet by mouth daily 90 tablet 2    clopidogrel (PLAVIX) 75 MG tablet TAKE 1 TABLET BY MOUTH EVERY DAY 90 tablet 3    aspirin 81 MG tablet Take 81 mg by mouth daily  carvedilol (COREG) 6.25 MG tablet Take 6.25 mg by mouth 2 times daily (with meals)      tamsulosin (FLOMAX) 0.4 MG capsule Take 1 capsule by mouth daily       No current facility-administered medications for this visit. Review of Systems:   Review of Systems   Constitutional: Negative. Negative for diaphoresis and fatigue. HENT: Negative. Eyes: Negative. Respiratory: Positive for cough. Negative for chest tightness, shortness of breath, wheezing and stridor. Cardiovascular: Negative. Negative for chest pain, palpitations and leg swelling. Gastrointestinal: Negative. Negative for blood in stool and nausea. Musculoskeletal: Negative. Skin: Negative. Neurological: Negative. Negative for dizziness, syncope, weakness and light-headedness. Hematological: Negative. Psychiatric/Behavioral: Negative. Physical Examination:    /85 (Site: Left Upper Arm, Position: Sitting, Cuff Size: Medium Adult)   Pulse 78   Resp 18   Ht 5' 4\" (1.626 m)   Wt 105 lb (47.6 kg)   SpO2 94%   BMI 18.02 kg/m²    Physical Exam   Constitutional: He appears healthy. No distress. HENT:   Normal cephalic and Atraumatic   Eyes: Pupils are equal, round, and reactive to light. Neck: Normal range of motion and thyroid normal. Neck supple. No JVD present. No neck adenopathy. No thyromegaly present. Cardiovascular: Normal rate and regular rhythm. Exam reveals decreased pulses. Murmur heard. Pulmonary/Chest: Effort normal and breath sounds normal. He has no wheezes. He has no rales. He exhibits no tenderness. RLL rales   Abdominal: Soft. Bowel sounds are normal. There is no abdominal tenderness. Musculoskeletal: Normal range of motion. General: No tenderness or edema. Neurological: He is alert and oriented to person, place, and time. Skin: Skin is warm. No cyanosis. Nails show no clubbing.        LABS:  CBC:   Lab Results   Component Value Date    WBC 5.5 12/10/2019    RBC 3.77 12/10/2019    HGB 13.1 12/10/2019    HCT 38.5 12/10/2019    .2 12/10/2019    MCH 34.8 12/10/2019    MCHC 34.0 12/10/2019    RDW 14.2 12/10/2019     12/10/2019    MPV 7.7 05/16/2013     Lipids:  Lab Results   Component Value Date    CHOL 130 12/10/2019    CHOL 175 10/05/2018    CHOL 193 05/16/2013     Lab Results   Component Value Date    TRIG 106 12/10/2019    TRIG 138 10/05/2018    TRIG 189 (H) 05/16/2013     Lab Results   Component Value Date    HDL 48 12/10/2019    HDL 46 10/05/2018    HDL 61 (H) 05/16/2013     Lab Results   Component Value Date    LDLCALC 61 12/10/2019    LDLCALC 101 10/05/2018    LDLCALC 94 05/16/2013     No results found for: LABVLDL, VLDL  No results found for: CHOLHDLRATIO  CMP:    Lab Results   Component Value Date     12/10/2019    K 4.1 12/10/2019     12/10/2019    CO2 20 12/10/2019    BUN 24 12/10/2019    CREATININE 1.28 12/10/2019    GFRAA >60.0 12/10/2019    LABGLOM 54.0 12/10/2019    GLUCOSE 84 12/10/2019    PROT 9.3 12/09/2019    LABALBU 4.0 12/09/2019    CALCIUM 8.1 12/10/2019    BILITOT 0.7 12/09/2019    ALKPHOS 117 12/09/2019    AST 37 12/09/2019    ALT 23 12/09/2019     BMP:    Lab Results   Component Value Date     12/10/2019    K 4.1 12/10/2019     12/10/2019    CO2 20 12/10/2019    BUN 24 12/10/2019    LABALBU 4.0 12/09/2019    CREATININE 1.28 12/10/2019    CALCIUM 8.1 12/10/2019    GFRAA >60.0 12/10/2019    LABGLOM 54.0 12/10/2019    GLUCOSE 84 12/10/2019     Magnesium:    Lab Results   Component Value Date    MG 2.4 12/09/2019     TSH:  Lab Results   Component Value Date    TSH 0.927 12/09/2019       Patient Active Problem List   Diagnosis    Elevated prostate specific antigen (PSA)    Hyperlipidemia    CAD (coronary artery disease)    [facility] with [facility] ([facility]    Bilateral carotid bruits    PRADO (dyspnea on exertion)    S/P PTCA (percutaneous transluminal coronary angioplasty)    Essential hypertension    Ischemic cardiomyopathy    Gross hematuria    Hematuria    Chest pain    Stroke-like episode    Pure hypercholesterolemia    Diminished pulses in lower extremity       There are no discontinued medications. Modified Medications    No medications on file       No orders of the defined types were placed in this encounter. Assessment/Plan:    1. Essential hypertension   stable     2. Pure hypercholesterolemia   statin     3. Coronary artery disease involving native coronary artery of native heart without angina pectoris   no angina - remains on ASA and Plavix. 4. CHF with cardiomyopathy ([address]     5. Hematuria- f/u Urology - stable no further blood. - remains stable     6. PRADO- stable      Counseling:  Heart Healthy Lifestyle, Low Salt Diet, Take Precautions to Prevent Falls and Walk Daily    Return in about 3 months (around 3/11/2021).       Electronically signed by Chris Ramirez MD on 12/11/2020 at 2:37 PM